# Patient Record
Sex: FEMALE | Race: WHITE | NOT HISPANIC OR LATINO | Employment: UNEMPLOYED | URBAN - METROPOLITAN AREA
[De-identification: names, ages, dates, MRNs, and addresses within clinical notes are randomized per-mention and may not be internally consistent; named-entity substitution may affect disease eponyms.]

---
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
## 2023-01-12 ENCOUNTER — EVALUATION (OUTPATIENT)
Dept: PHYSICAL THERAPY | Facility: MEDICAL CENTER | Age: 18
End: 2023-01-12

## 2023-01-12 DIAGNOSIS — G51.8 CRANIOFACIAL PAIN SYNDROME: Primary | ICD-10-CM

## 2023-01-12 DIAGNOSIS — R51.9 CRANIOFACIAL PAIN: ICD-10-CM

## 2023-01-12 DIAGNOSIS — G50.1 ATYPICAL FACIAL PAIN: ICD-10-CM

## 2023-01-12 NOTE — LETTER
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 and resulting in significant poor TMJ and lingual movement coordination  These issues are exacerbated by cervical hypomobility resulting in worry over not knowing what's wrong, concern at no signs of improvement and fear of not being able to hold a job due to frequent medical absences  In addition to the below plan of care, she would benefit from further consultation for a maxillary night splint and also to investigate tooth #2 and #15 due to significant ipsilateral pain with compression  I expect she will improve well with a multidisciplinary approach including neuromotor re-education, mobilizations and mobility exercises, behavioral alteration and a night splint over a period of 3 months  Please verify that you agree with the plan of care by signing the attached order  If you have any questions or concerns, please do not hesitate to call  I sincerely appreciate the opportunity to share in the care of one of your patients and hope to have another opportunity to work with you in the near future  Sincerely,    Toni Miranda DPT, PhD    Referring Provider:      I certify that I have read the below Plan of Care and certify the need for these services furnished under this plan of treatment while under my care                      [address] [place] [id number]  Via Fax: [phone]          PT Evaluation     Today's date: 2023  Patient name: Zeyad Thomas  : 2005  MRN: [id number]  Referring provider: Pool Gross DO  Dx:   Encounter Diagnosis     ICD-10-CM    1  Craniofacial pain syndrome  G51 8       2  Craniofacial pain  R51 9       3  Atypical facial pain  G50 1           Start Time: 1610  Stop Time: 1700  Total time in clinic (min): 50 minutes    Assessment  Assessment details: Problem List:  1) B ADDwR - addressing with neuromotor retraining   2) poor TMJ movement coordination - addressing with neuromotor retraining   3) poor lingual movement coordination - addressing with neuromotor retraining   4) cervical hypomobility - addressing with mobs and mobility exercises     Zeyad Thomas is a pleasant 21 y o  female who presents with B craniofacial pain that began about 6 months ago and has progressively worsened  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 and resulting in significant poor TMJ and lingual movement coordination  These issues are exacerbated by cervical hypomobility resulting in worry over not knowing what's wrong, concern at no signs of improvement and fear of not being able to hold a job due to frequent medical absences  In addition to the below plan of care, she would benefit from further consultation for a maxillary night splint and also to investigate tooth #2 and #15 due to significant ipsilateral pain with compression  I expect she will improve well with a multidisciplinary approach including neuromotor re-education, mobilizations and mobility exercises, behavioral alteration and a night splint over a period of 3 months  Positive prognostic indicators include positive attitude toward recovery  Negative prognostic indicators include chronicity of symptoms and high symptom irritability        Comparable signs:  1) chewing  2) cervical flexion rotation test (10 deg to L)  3) L cervical rotation    Goals  Patient will be independent with home exercise program    Patient will be able to manage symptoms independently  Patient will be able to chew without limitation due to pain  Patient will be able to yawn without limitation due to pain  Plan  Duration in visits: 6        Subjective Evaluation    History of Present Illness  Mechanism of injury: Patient reports she began having craniofacial pain insidiously 6 months ago  She reports the pain has worsened significantly over the past 6 months  Pain is worse first thing in AM and also with chewing  Yawning is a little more intermittent  She also reports daily headaches that began over 5 years ago  She does note her headaches are worsened with clenching and she tends to clench throughout the day and brux at night  Quality of life: good    Pain  At worst pain ratin    Patient Goals  Patient goals for therapy: decreased pain, increased motion and independence with ADLs/IADLs          Objective     Static Posture     Head  Forward  Shoulders  Rounded  Postural Observations  Seated posture: fair  Standing posture: fair        Palpation   Left   Hypertonic in the scalenes, sternocleidomastoid, suboccipitals, upper trapezius, masseter, temporalis, lateral pterygoid and medial pterygoid  Tenderness of the scalenes, sternocleidomastoid, suboccipitals, upper trapezius, masseter, temporalis, lateral pterygoid and medial pterygoid  Right   Hypertonic in the scalenes, sternocleidomastoid, suboccipitals, upper trapezius, masseter, temporalis, lateral pterygoid and medial pterygoid  Tenderness of the scalenes, sternocleidomastoid, suboccipitals, upper trapezius, masseter, temporalis, lateral pterygoid and medial pterygoid       Neurological Testing     Sensation   Cervical/Thoracic   Left   Intact: light touch    Right   Intact: light touch    Reflexes   Left   Biceps (C5/C6): normal (2+)  Alvarado's reflex: negative    Right   Biceps (C5/C6): normal (2+)  Alvarado's reflex: negative    Active Range of Motion   Cervical/Thoracic Spine       Cervical  Subcranial protraction:   Restriction level: moderate  Flexion:  WFL  Extension:  WFL  Left lateral flexion:  WFL  Right lateral flexion:  WFL  Left rotation:  Restriction level: moderate  Right rotation:  WFL  Left Shoulder   Normal active range of motion    Right Shoulder   Normal active range of motion    Left Elbow   Normal active range of motion    Right Elbow   Normal active range of motion    Left Wrist   Normal active range of motion    Right Wrist   Normal active range of motion    Joint Play     Hypomobile: C1, C2, C3, C4, C5, C6, C7 and T1     Strength/Myotome Testing   Cervical Spine     Left   Normal strength    Right   Normal strength    Tests   Cervical   Positive cervical distraction test and craniocervical flexion test   Negative alar ligament test, transverse ligament test and VBI  Left   Positive cervical flexion-rotation test     Negative Spurling's Test A       Right   Negative Spurling's Test A and cervical flexion-rotation test      Ambulation   Weight-Bearing Status   Weight-Bearing Status (Left): full weight bearing   Weight-Bearing Status (Right): full weight-bearing      Observational Gait   Gait: within functional limits   TMJ   Jaw observations: facial symmetry within normal limits  Occlusion class: class I (normal)  Patient does not have a  cleft palate  Dental findings: Low lingual resting position  Poor lingual dissociation  Tenderness and radiating ipsilateral pain with palpation of tooth #2 and #15  Scalloping of tongue: yes  Cusp wear: yes  Jaw trauma: no  Prognathia: no  Retrognathia: no  Mursicatio buccarum: yes  Corrective appliance comments: She reports she is getting a retainer shortly  Joint sounds left: clicking  Joint sounds right: clicking  Lateral bite test, Left: pain on right  Lateral bite test, right: pain on left  ROM: pain with movement  Opening (mm): 43   Lateral excursion, left (mm): 10 and pain  Lateral excursion, right (mm)t: 5 and pain   ROM comments: Inconsistent S curve deviation during opening and closing            Precautions: none    Date: 1/12      Manual       SOR/MFR SK      C1-2 SNAG SK                           Neuromuscular Re-education       TMJ relaxed position SK      Cervical retraction SK      TMJ controlled opening SK                    Therapeutic Exercise                                   Therapeutic Activities                     Gait Training                     Modalities                     Access Code: Lucy Young  URL: [email or web address]

## 2023-01-12 NOTE — PROGRESS NOTES
PT Evaluation     Today's date: 2023  Patient name: Trung Rosas  : 2005  MRN: [id number]  Referring provider: Dodie Lowry DO  Dx:   Encounter Diagnosis     ICD-10-CM    1  Craniofacial pain syndrome  G51 8       2  Craniofacial pain  R51 9       3  Atypical facial pain  G50 1           Start Time: 1610  Stop Time: 1700  Total time in clinic (min): 50 minutes    Assessment  Assessment details: Problem List:  1) B ADDwR - addressing with neuromotor retraining   2) poor TMJ movement coordination - addressing with neuromotor retraining   3) poor lingual movement coordination - addressing with neuromotor retraining   4) cervical hypomobility - addressing with mobs and mobility exercises     Trung Rosas is a pleasant 21 y o  female who presents with B craniofacial pain that began about 6 months ago and has progressively worsened  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 and resulting in significant poor TMJ and lingual movement coordination  These issues are exacerbated by cervical hypomobility resulting in worry over not knowing what's wrong, concern at no signs of improvement and fear of not being able to hold a job due to frequent medical absences  In addition to the below plan of care, she would benefit from further consultation for a maxillary night splint and also to investigate tooth #2 and #15 due to significant ipsilateral pain with compression  I expect she will improve well with a multidisciplinary approach including neuromotor re-education, mobilizations and mobility exercises, behavioral alteration and a night splint over a period of 3 months  Positive prognostic indicators include positive attitude toward recovery  Negative prognostic indicators include chronicity of symptoms and high symptom irritability        Comparable signs:  1) chewing  2) cervical flexion rotation test (10 deg to L)  3) L cervical rotation    Goals  Patient will be independent with home exercise program    Patient will be able to manage symptoms independently  Patient will be able to chew without limitation due to pain  Patient will be able to yawn without limitation due to pain  Plan  Duration in visits: 6        Subjective Evaluation    History of Present Illness  Mechanism of injury: Patient reports she began having craniofacial pain insidiously 6 months ago  She reports the pain has worsened significantly over the past 6 months  Pain is worse first thing in AM and also with chewing  Yawning is a little more intermittent  She also reports daily headaches that began over 5 years ago  She does note her headaches are worsened with clenching and she tends to clench throughout the day and brux at night  Quality of life: good    Pain  At worst pain ratin    Patient Goals  Patient goals for therapy: decreased pain, increased motion and independence with ADLs/IADLs          Objective     Static Posture     Head  Forward  Shoulders  Rounded  Postural Observations  Seated posture: fair  Standing posture: fair        Palpation   Left   Hypertonic in the scalenes, sternocleidomastoid, suboccipitals, upper trapezius, masseter, temporalis, lateral pterygoid and medial pterygoid  Tenderness of the scalenes, sternocleidomastoid, suboccipitals, upper trapezius, masseter, temporalis, lateral pterygoid and medial pterygoid  Right   Hypertonic in the scalenes, sternocleidomastoid, suboccipitals, upper trapezius, masseter, temporalis, lateral pterygoid and medial pterygoid  Tenderness of the scalenes, sternocleidomastoid, suboccipitals, upper trapezius, masseter, temporalis, lateral pterygoid and medial pterygoid       Neurological Testing     Sensation   Cervical/Thoracic   Left   Intact: light touch    Right   Intact: light touch    Reflexes   Left   Biceps (C5/C6): normal (2+)  Alvarado's reflex: negative    Right   Biceps (C5/C6): normal (2+)  Alvarado's reflex: negative    Active Range of Motion   Cervical/Thoracic Spine       Cervical  Subcranial protraction:   Restriction level: moderate  Flexion:  WFL  Extension:  WFL  Left lateral flexion:  WFL  Right lateral flexion:  WFL  Left rotation:  Restriction level: moderate  Right rotation:  WFL  Left Shoulder   Normal active range of motion    Right Shoulder   Normal active range of motion    Left Elbow   Normal active range of motion    Right Elbow   Normal active range of motion    Left Wrist   Normal active range of motion    Right Wrist   Normal active range of motion    Joint Play     Hypomobile: C1, C2, C3, C4, C5, C6, C7 and T1     Strength/Myotome Testing   Cervical Spine     Left   Normal strength    Right   Normal strength    Tests   Cervical   Positive cervical distraction test and craniocervical flexion test   Negative alar ligament test, transverse ligament test and VBI  Left   Positive cervical flexion-rotation test     Negative Spurling's Test A       Right   Negative Spurling's Test A and cervical flexion-rotation test      Ambulation   Weight-Bearing Status   Weight-Bearing Status (Left): full weight bearing   Weight-Bearing Status (Right): full weight-bearing      Observational Gait   Gait: within functional limits   TMJ   Jaw observations: facial symmetry within normal limits  Occlusion class: class I (normal)  Patient does not have a  cleft palate  Dental findings: Low lingual resting position  Poor lingual dissociation  Tenderness and radiating ipsilateral pain with palpation of tooth #2 and #15  Scalloping of tongue: yes  Cusp wear: yes  Jaw trauma: no  Prognathia: no  Retrognathia: no  Mursicatio buccarum: yes  Corrective appliance comments: She reports she is getting a retainer shortly  Joint sounds left: clicking  Joint sounds right: clicking  Lateral bite test, Left: pain on right  Lateral bite test, right: pain on left  ROM: pain with movement  Opening (mm): 43   Lateral excursion, left (mm): 10 and pain  Lateral excursion, right (mm)t: 5 and pain   ROM comments: Inconsistent S curve deviation during opening and closing             Precautions: none    Date: 1/12      Manual       SOR/MFR SK      C1-2 SNAG SK                           Neuromuscular Re-education       TMJ relaxed position SK      Cervical retraction SK      TMJ controlled opening SK                    Therapeutic Exercise                                   Therapeutic Activities                     Gait Training                     Modalities                     Access Code: Hortencia Barber  URL: [email or web address]

## 2023-01-12 NOTE — LETTER
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 and resulting in significant poor TMJ and lingual movement coordination  These issues are exacerbated by cervical hypomobility resulting in worry over not knowing what's wrong, concern at no signs of improvement and fear of not being able to hold a job due to frequent medical absences  In addition to the below plan of care, she would benefit from further consultation for a maxillary night splint and also to investigate tooth #2 and #15 due to significant ipsilateral pain with compression  I expect she will improve well with a multidisciplinary approach including neuromotor re-education, mobilizations and mobility exercises, behavioral alteration and a night splint over a period of 3 months  Please verify that you agree with the plan of care by signing the attached order  If you have any questions or concerns, please do not hesitate to call  I sincerely appreciate the opportunity to share in the care of one of your patients and hope to have another opportunity to work with you in the near future  Sincerely,    Rafita Griffiths DPT, PhD      Referring Provider:      I certify that I have read the below Plan of Care and certify the need for these services furnished under this plan of treatment while under my care                      Lucy Snellen, DMD  [address] [facility] [address]  Via Fax: [phone]          PT Evaluation     Today's date: 2023  Patient name: Krishan Hargrove  : 2005  MRN: [id number]  Referring provider: Chantelle Rosas DMD  Dx:   Encounter Diagnosis     ICD-10-CM    1  Craniofacial pain syndrome  G51 8       2  Craniofacial pain  R51 9       3  Atypical facial pain  G50 1                      Assessment  Assessment details: Problem List:  1) B ADDwR - addressing with neuromotor retraining   2) poor TMJ movement coordination - addressing with neuromotor retraining   3) poor lingual movement coordination - addressing with neuromotor retraining   4) cervical hypomobility - addressing with mobs and mobility exercises     Krishan Hargrove is a pleasant 21 y o  female who presents with B craniofacial pain that began about 6 months ago and has progressively worsened  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 and resulting in significant poor TMJ and lingual movement coordination  These issues are exacerbated by cervical hypomobility resulting in worry over not knowing what's wrong, concern at no signs of improvement and fear of not being able to hold a job due to frequent medical absences  In addition to the below plan of care, she would benefit from further consultation for a maxillary night splint and also to investigate tooth #2 and #15 due to significant ipsilateral pain with compression  I expect she will improve well with a multidisciplinary approach including neuromotor re-education, mobilizations and mobility exercises, behavioral alteration and a night splint over a period of 3 months  Positive prognostic indicators include positive attitude toward recovery  Negative prognostic indicators include chronicity of symptoms and high symptom irritability        Comparable signs:  1) chewing  2) cervical flexion rotation test (10 deg to L)  3) L cervical rotation    Goals  Patient will be independent with home exercise program    Patient will be able to manage symptoms independently  Patient will be able to chew without limitation due to pain  Patient will be able to yawn without limitation due to pain  Plan  Duration in visits: 6        Subjective Evaluation    History of Present Illness  Mechanism of injury: Patient reports she began having craniofacial pain insidiously 6 months ago  She reports the pain has worsened significantly over the past 6 months  Pain is worse first thing in AM and also with chewing  Yawning is a little more intermittent  She also reports daily headaches that began over 5 years ago  She does note her headaches are worsened with clenching and she tends to clench throughout the day and brux at night  Quality of life: good    Pain  At worst pain ratin    Patient Goals  Patient goals for therapy: decreased pain, increased motion and independence with ADLs/IADLs          Objective     Static Posture     Head  Forward  Shoulders  Rounded  Postural Observations  Seated posture: fair  Standing posture: fair        Palpation   Left   Hypertonic in the scalenes, sternocleidomastoid, suboccipitals, upper trapezius, masseter, temporalis, lateral pterygoid and medial pterygoid  Tenderness of the scalenes, sternocleidomastoid, suboccipitals, upper trapezius, masseter, temporalis, lateral pterygoid and medial pterygoid  Right   Hypertonic in the scalenes, sternocleidomastoid, suboccipitals, upper trapezius, masseter, temporalis, lateral pterygoid and medial pterygoid  Tenderness of the scalenes, sternocleidomastoid, suboccipitals, upper trapezius, masseter, temporalis, lateral pterygoid and medial pterygoid       Neurological Testing     Sensation   Cervical/Thoracic   Left   Intact: light touch    Right   Intact: light touch    Reflexes   Left   Biceps (C5/C6): normal (2+)  Alvarado's reflex: negative    Right   Biceps (C5/C6): normal (2+)  Alvarado's reflex: negative    Active Range of Motion   Cervical/Thoracic Spine Cervical  Subcranial protraction:   Restriction level: moderate  Flexion:  WFL  Extension:  WFL  Left lateral flexion:  WFL  Right lateral flexion:  WFL  Left rotation:  Restriction level: moderate  Right rotation:  WFL  Left Shoulder   Normal active range of motion    Right Shoulder   Normal active range of motion    Left Elbow   Normal active range of motion    Right Elbow   Normal active range of motion    Left Wrist   Normal active range of motion    Right Wrist   Normal active range of motion    Joint Play     Hypomobile: C1, C2, C3, C4, C5, C6, C7 and T1     Strength/Myotome Testing   Cervical Spine     Left   Normal strength    Right   Normal strength    Tests   Cervical   Positive cervical distraction test and craniocervical flexion test   Negative alar ligament test, transverse ligament test and VBI  Left   Positive cervical flexion-rotation test     Negative Spurling's Test A       Right   Negative Spurling's Test A and cervical flexion-rotation test      Ambulation   Weight-Bearing Status   Weight-Bearing Status (Left): full weight bearing   Weight-Bearing Status (Right): full weight-bearing      Observational Gait   Gait: within functional limits   TMJ   Jaw observations: facial symmetry within normal limits  Occlusion class: class I (normal)  Patient does not have a  cleft palate  Dental findings: Low lingual resting position  Poor lingual dissociation  Tenderness and radiating ipsilateral pain with palpation of tooth #2 and #15  Scalloping of tongue: yes  Cusp wear: yes  Jaw trauma: no  Prognathia: no  Retrognathia: no  Mursicatio buccarum: yes  Corrective appliance comments: She reports she is getting a retainer shortly  Joint sounds left: clicking  Joint sounds right: clicking  Lateral bite test, Left: pain on right  Lateral bite test, right: pain on left  ROM: pain with movement  Opening (mm): 43   Lateral excursion, left (mm): 10 and pain  Lateral excursion, right (mm)t: 5 and pain   ROM comments: Inconsistent S curve deviation during opening and closing            Precautions: none    Date: 1/12      Manual       SOR/MFR SK      C1-2 SNAG SK                           Neuromuscular Re-education       TMJ relaxed position SK      Cervical retraction SK      TMJ controlled opening SK                    Therapeutic Exercise                                   Therapeutic Activities                     Gait Training                     Modalities                     Access Code: Aysha Bower  URL: [email or web address]

## 2023-01-19 ENCOUNTER — OFFICE VISIT (OUTPATIENT)
Dept: PHYSICAL THERAPY | Facility: MEDICAL CENTER | Age: 18
End: 2023-01-19

## 2023-01-19 DIAGNOSIS — R51.9 CRANIOFACIAL PAIN: ICD-10-CM

## 2023-01-19 DIAGNOSIS — G50.1 ATYPICAL FACIAL PAIN: ICD-10-CM

## 2023-01-19 DIAGNOSIS — G51.8 CRANIOFACIAL PAIN SYNDROME: Primary | ICD-10-CM

## 2023-01-19 NOTE — PROGRESS NOTES
Daily Note     Today's date: 2023  Patient name: Parish Camargo  : 2005  MRN: [id number]  Referring provider: Bebeto Barnett DMD  Dx:   Encounter Diagnosis     ICD-10-CM    1  Craniofacial pain syndrome  G51 8       2  Craniofacial pain  R51 9       3  Atypical facial pain  G50 1                      Assessment:   Problem List:  1) B ADDwR - addressing with neuromotor retraining   2) poor TMJ movement coordination - addressing with neuromotor retraining   3) poor lingual movement coordination - addressing with neuromotor retraining   4) cervical hypomobility - addressing with mobs and mobility exercises     Comparable signs:  1) chewing - slight improvement  2) cervical flexion rotation test (10 deg to L) - improved  3) L cervical rotation - improved    Goals  Patient will be independent with home exercise program  - in progress  Patient will be able to manage symptoms independently  - in progress  Patient will be able to chew without limitation due to pain  - in progress  Patient will be able to yawn without limitation due to pain  - in progress      Plan: Reassess in 3 weeks  Subjective: Patient reports she has been doing her exercises regularly          Objective: See treatment diary below  ROM: pain with movement  Opening (mm): 43   Lateral excursion, left (mm): 10 and pain  Lateral excursion, right (mm): 10 and pain   ROM comments: Inconsistent S curve deviation during opening and closing    Precautions: none    Date:       Manual       SOR/MFR       C1-2 SNAG                            Neuromuscular Re-education       TMJ relaxed position SK      Cervical retraction SK      TMJ controlled opening SK      TMJ lateral deviation SK      Lingual dissociation SK                           Therapeutic Exercise                                   Therapeutic Activities                     Gait Training                     Modalities                     Access Code: [email or web address] Zurita  URL: [email or web address]

## 2023-02-09 ENCOUNTER — APPOINTMENT (OUTPATIENT)
Dept: PHYSICAL THERAPY | Facility: MEDICAL CENTER | Age: 18
End: 2023-02-09

## 2023-03-16 ENCOUNTER — OFFICE VISIT (OUTPATIENT)
Dept: PHYSICAL THERAPY | Facility: MEDICAL CENTER | Age: 18
End: 2023-03-16

## 2023-03-16 DIAGNOSIS — R51.9 CRANIOFACIAL PAIN: ICD-10-CM

## 2023-03-16 DIAGNOSIS — G50.1 ATYPICAL FACIAL PAIN: ICD-10-CM

## 2023-03-16 DIAGNOSIS — G51.8 CRANIOFACIAL PAIN SYNDROME: Primary | ICD-10-CM

## 2023-03-16 NOTE — PROGRESS NOTES
Daily Note     Today's date: 3/16/2023  Patient name: Wendie Schaefer  : 2005  MRN: [id number]  Referring provider: Yvonne Corrales DMD  Dx:   Encounter Diagnosis     ICD-10-CM    1  Craniofacial pain syndrome  G51 8       2  Craniofacial pain  R51 9       3  Atypical facial pain  G50 1                      Assessment:   Problem List:  1) B ADDwR - addressing with neuromotor retraining   2) poor TMJ movement coordination - addressing with neuromotor retraining   3) poor lingual movement coordination - addressing with neuromotor retraining   4) cervical hypomobility - addressing with mobs and mobility exercises     Comparable signs:  1) chewing - slight improvement  2) cervical flexion rotation test (10 deg to L) - improved  3) L cervical rotation - improved    Goals  Patient will be independent with home exercise program  - in progress  Patient will be able to manage symptoms independently  - in progress  Patient will be able to chew without limitation due to pain  - in progress  Patient will be able to yawn without limitation due to pain  - in progress      Plan: Reassess in 5 weeks  Subjective: Patient reports she has been doing her exercises regularly  She still has pain with chewing and yawning but less so  She notes her dog ate her night splint so she is now waiting for a new one        Objective: See treatment diary below  ROM: pain with movement  Opening (mm): 43   Lateral excursion, left (mm): 10   Lateral excursion, right (mm): 10    ROM comments: Inconsistent S curve deviation during opening and closing, but none with concentration    Precautions: none    Date: 3/16      Manual       SOR/MFR       C1-2 SNAG                            Neuromuscular Re-education       TMJ relaxed position SK      Cervical retraction SK      TMJ controlled opening SK      TMJ lateral deviation SK      Lingual dissociation SK      TMJ rhythmic stab SK                    Therapeutic Exercise Therapeutic Activities                     Gait Training                     Modalities                     Access Code: Angela Elaine: [email or web address]

## 2024-03-18 ENCOUNTER — OFFICE VISIT (OUTPATIENT)
Dept: OBGYN CLINIC | Facility: CLINIC | Age: 19
End: 2024-03-18
Payer: COMMERCIAL

## 2024-03-18 VITALS
WEIGHT: 174.8 LBS | HEIGHT: 66 IN | BODY MASS INDEX: 28.09 KG/M2 | DIASTOLIC BLOOD PRESSURE: 72 MMHG | SYSTOLIC BLOOD PRESSURE: 114 MMHG

## 2024-03-18 DIAGNOSIS — R10.2 PELVIC PAIN IN FEMALE: ICD-10-CM

## 2024-03-18 DIAGNOSIS — Z11.3 SCREENING EXAMINATION FOR STD (SEXUALLY TRANSMITTED DISEASE): ICD-10-CM

## 2024-03-18 DIAGNOSIS — N92.6 MISSED MENSES: ICD-10-CM

## 2024-03-18 DIAGNOSIS — N91.4 SECONDARY OLIGOMENORRHEA: Primary | ICD-10-CM

## 2024-03-18 LAB — SL AMB POCT URINE HCG: NORMAL

## 2024-03-18 PROCEDURE — 81025 URINE PREGNANCY TEST: CPT | Performed by: NURSE PRACTITIONER

## 2024-03-18 PROCEDURE — 87591 N.GONORRHOEAE DNA AMP PROB: CPT | Performed by: NURSE PRACTITIONER

## 2024-03-18 PROCEDURE — 87491 CHLMYD TRACH DNA AMP PROBE: CPT | Performed by: NURSE PRACTITIONER

## 2024-03-18 PROCEDURE — 99204 OFFICE O/P NEW MOD 45 MIN: CPT | Performed by: NURSE PRACTITIONER

## 2024-03-18 RX ORDER — IBUPROFEN 200 MG
600 TABLET ORAL EVERY 6 HOURS PRN
COMMUNITY

## 2024-03-18 NOTE — PROGRESS NOTES
Assessment/Plan:    1. Secondary oligomenorrhea  In 17 yo G0, post cessation of OCP in June 2023.  Urine HCG today was negative.  Discussed the possibility of PCOS and the nature of PCOS, including associated issues of infertility, future risk for diabetes and/ or heart disease, risk of endometrial pathology/ cancer.  Will check a fasting PCOS lab panel and a pelvic ultrasound for clinical correlation.  She is interested in conceiving.  RV in 3-4 weeks to discuss work up and management.    - Comprehensive metabolic panel; Future  - DHEA-sulfate; Future  - Estradiol; Future  - Follicle stimulating hormone; Future  - Insulin, fasting; Future  - Lipid panel; Future  - Luteinizing hormone; Future  - Prolactin; Future  - Testosterone, free, total; Future  - TSH, 3rd generation with Free T4 reflex; Future  - 17-Hydroxyprogesterone; Future  - US pelvis complete w transvaginal; Future    2. Missed menses  Negative hcg    - POCT urine HCG    3. Pelvic pain in female  As above, will be checking pelvic US    4. Screening examination for STD (sexually transmitted disease)  New partner, unprotected    - Chlamydia/GC amplified DNA by PCR      I have spent a total time of 45 minutes on 03/18/24 in caring for this patient including Diagnostic results, Instructions for management, Patient and family education, Risk factor reductions, Impressions, Counseling / Coordination of care, Documenting in the medical record, Reviewing / ordering tests, medicine, procedures  , and Obtaining or reviewing history  .    [id number]    Subjective:      Patient ID: Julia Haines is a 18 y.o. female.    HPI  NEW PATIENT PROBLEM  CC: irregular menstrual periods    17 yo G0 presents with complaint of irregular menstrual patterns.  Started OCP at age 12 for cycle management due to irregular periods/ elongated bleeding.  She discontinued her OCP in 6/2023.  From June through Nov 2023 she reports having monthly periods.  Her last menses was in November  "and she did not bleed again until 2/14/24 at which time is was very scant staining for one day.  [facility] for evaluation.  A urine HCG at that visit was negative and a PCOS panel and pelvic US were ordered.  She has not had any of the studies done yet.  She presents for a 2nd opinion today.    Seen at ED several times for RLQ pain.  Imaging is always normal.  Told likely due to constipation or muscle spasm.  Takes miralax and stool softeners which is helpful.  2/21/24 CT scan: no acute findings  4/19/24 pelvic US: negative  STD hx: none    The following portions of the patient's history were reviewed and updated as appropriate: allergies, current medications, past family history, past medical history, past social history, past surgical history, and problem list.    Review of Systems   Constitutional:  Negative for chills, fatigue, fever and unexpected weight change.   Eyes:  Negative for visual disturbance.   Cardiovascular:  Negative for palpitations.   Gastrointestinal:  Positive for constipation and nausea (upon awakening, then resolves). Negative for diarrhea and vomiting.   Endocrine: Positive for heat intolerance (night sweats). Negative for cold intolerance, polydipsia, polyphagia and polyuria.   Genitourinary:  Positive for menstrual problem and pelvic pain (RLQ, was seen in ED, imaging always normal). Negative for difficulty urinating, dyspareunia, dysuria, frequency, genital sores, urgency, vaginal bleeding, vaginal discharge and vaginal pain.   Skin:         Denies excessive oiliness, dryness, acne or hair growth   Psychiatric/Behavioral:  Negative for agitation, dysphoric mood, sleep disturbance and suicidal ideas. The patient is not nervous/anxious.     Denies nipple discharge.     Objective:      /72 (BP Location: Left arm, Patient Position: Sitting, Cuff Size: Standard)   Ht 5' 6\" (1.676 m)   Wt 79.3 kg (174 lb 12.8 oz)   LMP 11/01/2023 Comment: light spotting on feb " 14 for only a day  BMI 28.21 kg/m²     Urine hcg negative     Physical Exam  Constitutional:       General: She is not in acute distress.     Appearance: Normal appearance. She is well-developed. She is not ill-appearing or diaphoretic.      Comments: BMI 28.2   HENT:      Head: Normocephalic and atraumatic.   Eyes:      Pupils: Pupils are equal, round, and reactive to light.   Neck:      Thyroid: No thyromegaly.   Pulmonary:      Effort: Pulmonary effort is normal.   Abdominal:      General: Abdomen is flat.      Palpations: Abdomen is soft. There is no mass.      Tenderness: There is no abdominal tenderness.   Genitourinary:     General: Normal vulva.      Exam position: Lithotomy position.      Labia:         Right: No rash, tenderness, lesion or injury.         Left: No rash, tenderness, lesion or injury.       Vagina: No signs of injury and foreign body. No vaginal discharge, erythema, tenderness or bleeding.      Cervix: No cervical motion tenderness, discharge or friability.      Uterus: Not enlarged and not tender.       Adnexa:         Right: No mass or tenderness.          Left: No mass or tenderness.     Musculoskeletal:      Cervical back: Neck supple.   Lymphadenopathy:      Cervical:      Right cervical: No superficial cervical adenopathy.     Left cervical: No superficial cervical adenopathy.   Skin:     General: Skin is warm and dry.   Neurological:      General: No focal deficit present.      Mental Status: She is alert and oriented to person, place, and time.   Psychiatric:         Mood and Affect: Mood normal.         Behavior: Behavior normal.         Thought Content: Thought content normal.         Judgment: Judgment normal.

## 2024-03-19 LAB
C TRACH DNA SPEC QL NAA+PROBE: NEGATIVE
N GONORRHOEA DNA SPEC QL NAA+PROBE: NEGATIVE

## 2024-03-22 ENCOUNTER — APPOINTMENT (OUTPATIENT)
Dept: LAB | Facility: CLINIC | Age: 19
End: 2024-03-22
Payer: COMMERCIAL

## 2024-03-22 DIAGNOSIS — N91.4 SECONDARY OLIGOMENORRHEA: ICD-10-CM

## 2024-03-22 LAB
ALBUMIN SERPL BCP-MCNC: 4.7 G/DL (ref 3.5–5)
ALP SERPL-CCNC: 83 U/L (ref 34–104)
ALT SERPL W P-5'-P-CCNC: 11 U/L (ref 7–52)
ANION GAP SERPL CALCULATED.3IONS-SCNC: 7 MMOL/L (ref 4–13)
AST SERPL W P-5'-P-CCNC: 14 U/L (ref 13–39)
BILIRUB SERPL-MCNC: 0.61 MG/DL (ref 0.2–1)
BUN SERPL-MCNC: 12 MG/DL (ref 5–25)
CALCIUM SERPL-MCNC: 9.4 MG/DL (ref 8.4–10.2)
CHLORIDE SERPL-SCNC: 105 MMOL/L (ref 96–108)
CHOLEST SERPL-MCNC: 170 MG/DL
CO2 SERPL-SCNC: 26 MMOL/L (ref 21–32)
CREAT SERPL-MCNC: 0.66 MG/DL (ref 0.6–1.3)
ESTRADIOL SERPL-MCNC: 77.6 PG/ML
FSH SERPL-ACNC: 2.1 MIU/ML
GFR SERPL CREATININE-BSD FRML MDRD: 127 ML/MIN/1.73SQ M
GLUCOSE P FAST SERPL-MCNC: 79 MG/DL (ref 65–99)
HDLC SERPL-MCNC: 50 MG/DL
INSULIN SERPL-ACNC: 13.24 UIU/ML (ref 1.9–23)
LDLC SERPL CALC-MCNC: 105 MG/DL (ref 0–100)
LH SERPL-ACNC: 1.9 MIU/ML
NONHDLC SERPL-MCNC: 120 MG/DL
POTASSIUM SERPL-SCNC: 4.3 MMOL/L (ref 3.5–5.3)
PROLACTIN SERPL-MCNC: 6.21 NG/ML (ref 3.34–26.72)
PROT SERPL-MCNC: 7.4 G/DL (ref 6.4–8.4)
SODIUM SERPL-SCNC: 138 MMOL/L (ref 135–147)
TRIGL SERPL-MCNC: 77 MG/DL
TSH SERPL DL<=0.05 MIU/L-ACNC: 1.41 UIU/ML (ref 0.45–4.5)

## 2024-03-22 PROCEDURE — 80053 COMPREHEN METABOLIC PANEL: CPT

## 2024-03-22 PROCEDURE — 36415 COLL VENOUS BLD VENIPUNCTURE: CPT

## 2024-03-22 PROCEDURE — 84403 ASSAY OF TOTAL TESTOSTERONE: CPT

## 2024-03-22 PROCEDURE — 83002 ASSAY OF GONADOTROPIN (LH): CPT

## 2024-03-22 PROCEDURE — 80061 LIPID PANEL: CPT

## 2024-03-22 PROCEDURE — 83525 ASSAY OF INSULIN: CPT

## 2024-03-22 PROCEDURE — 84443 ASSAY THYROID STIM HORMONE: CPT

## 2024-03-22 PROCEDURE — 83001 ASSAY OF GONADOTROPIN (FSH): CPT

## 2024-03-22 PROCEDURE — 82627 DEHYDROEPIANDROSTERONE: CPT

## 2024-03-22 PROCEDURE — 82670 ASSAY OF TOTAL ESTRADIOL: CPT

## 2024-03-22 PROCEDURE — 83498 ASY HYDROXYPROGESTERONE 17-D: CPT

## 2024-03-22 PROCEDURE — 84402 ASSAY OF FREE TESTOSTERONE: CPT

## 2024-03-22 PROCEDURE — 84146 ASSAY OF PROLACTIN: CPT

## 2024-03-24 LAB
DHEA-S SERPL-MCNC: 144 UG/DL (ref 110–431.7)
TESTOST FREE SERPL-MCNC: 0.4 PG/ML (ref 0–4.2)
TESTOST SERPL-MCNC: 19 NG/DL (ref 13–71)

## 2024-03-26 LAB — 17OHP SERPL-MCNC: 42 NG/DL

## 2024-03-26 NOTE — RESULT ENCOUNTER NOTE
Cholesterol panel: LDL elevated at 105.  Recommend follow up with PCP.  17 hydroxyprogesterone 42  DHEAS 144  Free testosterone 0.4; total testosterone 19  Estradiol 77.6, FSH 2.1, LH 1.9  Prolactin 6.2, TSH 1.4, fasting insulin 13.2  CMP normal  Pelvic US scheduled.  Patient has follow up appt with me 4/8/24.

## 2024-04-10 ENCOUNTER — HOSPITAL ENCOUNTER (OUTPATIENT)
Dept: ULTRASOUND IMAGING | Facility: MEDICAL CENTER | Age: 19
Discharge: HOME | End: 2024-04-10
Payer: COMMERCIAL

## 2024-04-10 DIAGNOSIS — N91.4 SECONDARY OLIGOMENORRHEA: ICD-10-CM

## 2024-04-10 PROCEDURE — 76856 US EXAM PELVIC COMPLETE: CPT

## 2024-04-10 PROCEDURE — 76830 TRANSVAGINAL US NON-OB: CPT

## 2024-04-15 ENCOUNTER — OFFICE VISIT (OUTPATIENT)
Dept: OBGYN CLINIC | Facility: CLINIC | Age: 19
End: 2024-04-15
Payer: COMMERCIAL

## 2024-04-15 VITALS
SYSTOLIC BLOOD PRESSURE: 112 MMHG | BODY MASS INDEX: 27.97 KG/M2 | DIASTOLIC BLOOD PRESSURE: 64 MMHG | HEIGHT: 66 IN | WEIGHT: 174 LBS

## 2024-04-15 DIAGNOSIS — N91.4 SECONDARY OLIGOMENORRHEA: Primary | ICD-10-CM

## 2024-04-15 PROCEDURE — 99213 OFFICE O/P EST LOW 20 MIN: CPT | Performed by: NURSE PRACTITIONER

## 2024-04-15 RX ORDER — MEDROXYPROGESTERONE ACETATE 10 MG/1
TABLET ORAL
Qty: 10 TABLET | Refills: 3 | Status: SHIPPED | OUTPATIENT
Start: 2024-04-15

## 2024-04-15 NOTE — PROGRESS NOTES
Assessment/Plan:    1. Secondary oligomenorrhea    - medroxyPROGESTERone (PROVERA) 10 mg tablet; Take one by mouth daily for ten days whenever you are without a period for two months.  Dispense: 10 tablet; Refill: 3    Reviewed all lab work in detail.  Explained that the results thus far do not support diagnosis of PCOS.  Suggested she see her PCP regarding her elevated LDLs and she has made an appointment.  We will await the final ultrasound result and I will call her to discuss the findings.  Since she desires to conceive, she is agreeable to inducing a period every 2 months with provera as needed.  Explained how to take the provera and to check a urine HCG prior to taking it.    RV 9/2024 for Yearly/1st pap and to see how she is doing.    I have spent a total time of 25 minutes on 04/15/24 in caring for this patient including Diagnostic results, Risks and benefits of tx options, Instructions for management, Patient and family education, Impressions, and Documenting in the medical record.      Subjective:      Patient ID: Julia Haines is a 18 y.o. female.    HPI  PROBLEM VISIT  CC: secondary oligomenorrhea    Historically irregular cycles which was treated with an OCP.  She d/dionisio OCP 6/2023, initially had monthly cycles then was without a period for 4 months.  She had a period in 11/2023, then skipped until 2/2024 at which time she only spotted.  She eventually had a spontaneous period that started on 3/21 which was very heavy, using a pad and tampon, changing every 30-60 mins, no clots, no cramps.    She is interested in conceiving.    Hormonal panel and pelvic US were ordered to rule out PCOS.  Labs:   Cholesterol panel: LDL elevated at 105.  Recommend follow up with PCP.  17 hydroxyprogesterone 42  DHEAS 144  Free testosterone 0.4; total testosterone 19  Estradiol 77.6, FSH 2.1, LH 1.9  Prolactin 6.2, TSH 1.4, fasting insulin 13.2  CMP normal    Pelvic US, completed 4/10/24, not read yet.  We contacted the  "radiology reading room and unfortunately could not get a reading in time for patient's visit.    The following portions of the patient's history were reviewed and updated as appropriate: allergies, current medications, past family history, past medical history, past social history, past surgical history, and problem list.    Review of Systems   Constitutional:  Negative for chills and fever.   Genitourinary:  Positive for menstrual problem.         Objective:    /64 (BP Location: Right arm, Patient Position: Sitting, Cuff Size: Standard)   Ht 5' 6\" (1.676 m)   Wt 78.9 kg (174 lb)   LMP 03/21/2024 (Exact Date) Comment: light spotting on feb 14 for only a day  BMI 28.08 kg/m²      Physical Exam  Constitutional:       General: She is not in acute distress.     Appearance: Normal appearance. She is not ill-appearing or diaphoretic.      Comments: Bmi 28.1   HENT:      Head: Normocephalic and atraumatic.   Eyes:      Pupils: Pupils are equal, round, and reactive to light.   Pulmonary:      Effort: Pulmonary effort is normal.   Skin:     General: Skin is warm and dry.   Neurological:      General: No focal deficit present.      Mental Status: She is alert and oriented to person, place, and time.   Psychiatric:         Mood and Affect: Mood normal.         Behavior: Behavior normal.         Thought Content: Thought content normal.         Judgment: Judgment normal.           "

## 2024-04-15 NOTE — PATIENT INSTRUCTIONS
Whenever you are without a spontaneous period for two months, check a urine pregnancy test and take the provera medication once daily for 10 days.

## 2024-05-21 ENCOUNTER — ULTRASOUND (OUTPATIENT)
Dept: OBGYN CLINIC | Facility: CLINIC | Age: 19
End: 2024-05-21
Payer: COMMERCIAL

## 2024-05-21 VITALS
DIASTOLIC BLOOD PRESSURE: 84 MMHG | HEART RATE: 89 BPM | SYSTOLIC BLOOD PRESSURE: 110 MMHG | BODY MASS INDEX: 27.97 KG/M2 | HEIGHT: 66 IN | WEIGHT: 174 LBS

## 2024-05-21 DIAGNOSIS — N92.6 MISSED MENSES: Primary | ICD-10-CM

## 2024-05-21 DIAGNOSIS — Z32.01 POSITIVE PREGNANCY TEST: ICD-10-CM

## 2024-05-21 DIAGNOSIS — N92.6 IRREGULAR MENSES: ICD-10-CM

## 2024-05-21 PROCEDURE — 76830 TRANSVAGINAL US NON-OB: CPT | Performed by: OBSTETRICS & GYNECOLOGY

## 2024-05-21 PROCEDURE — 99214 OFFICE O/P EST MOD 30 MIN: CPT | Performed by: OBSTETRICS & GYNECOLOGY

## 2024-05-21 NOTE — PROGRESS NOTES
Patient is a 18 y.o.  with Patient's last menstrual period was 2024 (exact date). who presents requesting evaluation of pregnancy. Pt was initially scheduled for a dating and viability ultrasound.  Pt reports her LMP was 3/21/2024, however she has irregular periods and can go up to 3 months without a period. She had an hcg on 2024 that was 24. Pt denies any bleeding. Pt advised that she is likely too early in her pregnancy to see a fetus, but I will attempt today. If unable to see anything, will plan to repeat her u/s in 3 weeks. Pt is agreeable.     Past Medical History:   Diagnosis Date    ADHD (attention deficit hyperactivity disorder)     not currently medicated    Anxiety     Developmental delay 2009    History of menorrhagia     treated with OCP    History of palpitations     evaluated 2023; metoprolol    History of suicidal ideation 2022    few years ago, recovered    Seasonal allergic rhinitis        Past Surgical History:   Procedure Laterality Date    WISDOM TOOTH EXTRACTION         OB History    Para Term  AB Living   0 0 0 0 0 0   SAB IAB Ectopic Multiple Live Births   0 0 0 0 0   Obstetric Comments   Menarche: 10   Cycles historically irregular, bled for months at a time.  Started OCP at age 12.  Discontinued 2023, now skipping cycles.   Gardasil completed           Current Outpatient Medications:     ibuprofen (MOTRIN) 200 mg tablet, Take 600 mg by mouth every 6 (six) hours as needed for moderate pain (Patient not taking: Reported on 3/18/2024), Disp: , Rfl:     medroxyPROGESTERone (PROVERA) 10 mg tablet, Take one by mouth daily for ten days whenever you are without a period for two months. (Patient not taking: Reported on 2024), Disp: 10 tablet, Rfl: 3    Allergies   Allergen Reactions    Shellfish Allergy - Food Allergy Anaphylaxis    Shellfish-Derived Products - Food Allergy Other (See Comments)     unknown    Octacosanol Allergic Rhinitis     Coughing,  sneezing       Social History     Socioeconomic History    Marital status: Single     Spouse name: None    Number of children: None    Years of education: None    Highest education level: None   Occupational History    None   Tobacco Use    Smoking status: Never    Smokeless tobacco: Never   Vaping Use    Vaping status: Never Used   Substance and Sexual Activity    Alcohol use: Never    Drug use: Never    Sexual activity: Yes     Partners: Male     Birth control/protection: None     Comment: coitarche 12; 3 lifetime partners; current partner of 6 months   Other Topics Concern    None   Social History Narrative    None     Social Determinants of Health     Financial Resource Strain: Low Risk  (11/16/2022)    [facility]    Overall Financial Resource Strain (CARDIA)     Difficulty of Paying Living Expenses: Not hard at all   Food Insecurity: No Food Insecurity (11/16/2022)    [facility]    Hunger Vital Sign     Worried About Running Out of Food in the Last Year: Never true     Ran Out of Food in the Last Year: Never true   Transportation Needs: No Transportation Needs (11/16/2022)    [facility]    PRAPARE - Transportation     Lack of Transportation (Medical): No     Lack of Transportation (Non-Medical): No   Physical Activity: Not on file   Stress: No Stress Concern Present (11/16/2022)    [facility]    [place] [employer] - Occupational Stress Questionnaire     Feeling of Stress : Not at all   Social Connections: Socially Isolated (11/16/2022)    [facility]    Social Connection and Isolation Panel [NHANES]     Frequency of Communication with Friends and Family: Three times a week     Frequency  of Social Gatherings with Friends and Family: Three times a week     Attends Nondenominational Services: Never     Active Member of Clubs or Organizations: No     Attends Club or Organization Meetings: Never     Marital Status: Never    Intimate Partner Violence: Not At Risk (11/16/2022)    [facility]    Humiliation, Afraid, Rape, and Kick questionnaire     Fear of Current or Ex-Partner: No     Emotionally Abused: No     Physically Abused: No     Sexually Abused: No   Housing Stability: Low Risk  (11/16/2022)    [facility]    Housing Stability Vital Sign     Unable to Pay for Housing in the Last Year: No     Number of Places Lived in the Last Year: 1     Unstable Housing in the Last Year: No       Family History   Problem Relation Age of Onset    ADD / ADHD Mother     Anxiety disorder Mother     Autism Brother     Pancreatic cancer Maternal Grandmother     Bipolar disorder Paternal Grandmother     Heart attack Paternal Grandfather     Breast cancer Neg Hx     Colon cancer Neg Hx     Ovarian cancer Neg Hx     Uterine cancer Neg Hx        Review of Systems   Constitutional:  Negative for chills, fatigue, fever and unexpected weight change.   HENT:  Negative for congestion, mouth sores and sore throat.    Respiratory:  Negative for cough, chest tightness, shortness of breath and wheezing.    Cardiovascular:  Negative for chest pain and palpitations.   Gastrointestinal:  Negative for abdominal distention, abdominal pain, constipation, diarrhea, nausea and vomiting.   Endocrine: Negative for cold intolerance and heat intolerance.   Genitourinary:  Positive for menstrual problem (as per HPI). Negative for dyspareunia, dysuria, genital sores, pelvic pain, vaginal bleeding, vaginal discharge and vaginal pain.   Musculoskeletal:  Negative for arthralgias.   Skin:  Negative for color change and rash.  "  Neurological:  Negative for dizziness, light-headedness and headaches.   Hematological:  Negative for adenopathy.       Blood pressure 110/84, pulse 89, height 5' 6\" (1.676 m), weight 78.9 kg (174 lb), last menstrual period 03/21/2024. and Body mass index is 28.08 kg/m².    Physical Exam  Constitutional:       General: She is not in acute distress.     Appearance: Normal appearance. She is not ill-appearing.   HENT:      Head: Normocephalic and atraumatic.   Eyes:      Extraocular Movements: Extraocular movements intact.      Conjunctiva/sclera: Conjunctivae normal.   Pulmonary:      Effort: Pulmonary effort is normal.   Musculoskeletal:         General: Normal range of motion.      Cervical back: Normal range of motion.   Skin:     General: Skin is warm.      Findings: No erythema or rash.   Neurological:      Mental Status: She is alert and oriented to person, place, and time.   Psychiatric:         Mood and Affect: Mood normal.         Behavior: Behavior normal.         Thought Content: Thought content normal.         Judgment: Judgment normal.         AMB US Pelvic Non OB    Date/Time: 5/21/2024 5:30 PM    Performed by: Helena Olmstead MD  Authorized by: Helena Olmstead MD  Universal Protocol:  Consent: Verbal consent obtained. Written consent not obtained.  Risks and benefits: risks, benefits and alternatives were discussed  Consent given by: patient  Time out: Immediately prior to procedure a \"time out\" was called to verify the correct patient, procedure, equipment, support staff and site/side marked as required.  Patient understanding: patient states understanding of the procedure being performed  Required items: required blood products, implants, devices, and special equipment available  Patient identity confirmed: verbally with patient    Procedure details:     SIS Procedure: No    Technique:  Transvaginal US, Non-OB    Position: lithotomy exam    Uterine findings:     Length (cm): 6.5    Height (cm):  " 4.4    Width (cm):  5.8    Uterine adhesions: not identified      Adnexal mass: not identified      Polyps: not identified      Myomas: not identified      Endometrial stripe visualized: gestational sac without yolk sac or fetal pole noted.      Follicles present: identified      Number of follicles:  2  Left ovary findings:     Left ovary:  Not visualized    Cysts: not identified    Right ovary findings:     Right ovary:  Visualized    Cysts: not identified      Length (cm): 1.9    Height (cm): 1.8    Width (cm): 1.2  Other findings:     Free pelvic fluid: not identified      Free peritoneal fluid: not identified    Post-Procedure Details:     Impression:  Reviewed with patient that while her ultrasound shows a gestational sac, I cannot see a fetal pole or a yolk sac. We reviewed that the etiology of this could be early pregnancy, non viable pregnancy and less likely, ectopic pregnancy. Pt will return for 3 weeks for a dating and viability ultrasound due to hcg of 24 on .    Tolerance:  Tolerated well, no immediate complications    Complications: no complications              A/P:  Pt is a 18 y.o.  with      Julia was seen today for pregnancy ultrasound.    Diagnoses and all orders for this visit:    Missed menses  -     AMB US Pelvic Non OB    Positive pregnancy test  -     AMB US Pelvic Non OB    Irregular menses    Likey the source of inability to visualized pregnancy at intended date. Pt will return in 3 weeks for dating and viability ultrasound.

## 2024-05-28 ENCOUNTER — ROUTINE PRENATAL (OUTPATIENT)
Dept: OBGYN CLINIC | Facility: CLINIC | Age: 19
End: 2024-05-28
Payer: COMMERCIAL

## 2024-05-28 VITALS
DIASTOLIC BLOOD PRESSURE: 60 MMHG | BODY MASS INDEX: 28.12 KG/M2 | SYSTOLIC BLOOD PRESSURE: 103 MMHG | HEIGHT: 66 IN | WEIGHT: 175 LBS

## 2024-05-28 DIAGNOSIS — O41.8X10 SUBCHORIONIC HEMATOMA IN FIRST TRIMESTER, SINGLE OR UNSPECIFIED FETUS: ICD-10-CM
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
## 2024-05-28 DIAGNOSIS — Z34.90 EARLY STAGE OF PREGNANCY: ICD-10-CM

## 2024-05-28 DIAGNOSIS — O46.8X1 SUBCHORIONIC HEMATOMA IN FIRST TRIMESTER, SINGLE OR UNSPECIFIED FETUS: ICD-10-CM

## 2024-05-28 PROCEDURE — 99213 OFFICE O/P EST LOW 20 MIN: CPT | Performed by: NURSE PRACTITIONER
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
## 2024-06-17 ENCOUNTER — ULTRASOUND (OUTPATIENT)
Dept: OBGYN CLINIC | Facility: CLINIC | Age: 19
End: 2024-06-17

## 2024-06-17 VITALS
DIASTOLIC BLOOD PRESSURE: 80 MMHG | BODY MASS INDEX: 28.25 KG/M2 | SYSTOLIC BLOOD PRESSURE: 132 MMHG | HEIGHT: 66 IN | WEIGHT: 175.8 LBS

## 2024-06-17 DIAGNOSIS — Z36.9 ANTENATAL SCREENING ENCOUNTER: ICD-10-CM

## 2024-06-17 DIAGNOSIS — O36.80X0 ENCOUNTER TO DETERMINE FETAL VIABILITY OF PREGNANCY, SINGLE OR UNSPECIFIED FETUS: Primary | ICD-10-CM

## 2024-06-17 NOTE — PROGRESS NOTES
Serial transvaginal images were obtained through the gravid maternal uterus. The patient's LMP was 3/21/2024 with an MARIN of  12/26/2024 and a gestational age of  12w4d (based on LMP).  A previous ultrasound was performed consistent with a gestational age of 10w0d and an MARIN of 01/13/2025 by ultrasound. The indication for today's examination is to confirm fetal size and viability.     CRL=               4.03cm    11w0d       MARIN 01/06/2025  YS=                 4.0mm  FHR=               171bpm     The uterus demonstrates a small subchorionic bleed, 1cm. The bilateral ovaries appear within normal limits.      Rt Ovary=3.03 x 1.52 x 2.17cm  Lt. Ovary=2.53 x 1.56 x 1.75cm     Impression:Single, viable IUP.     Adry Zeng RDMS     GE [id number]-[id number] transvaginal transducer Serial #[id number] was used to perform the examination today and subsequently followed with high level disinfection utilizing Trophon EPR procedure.     [facility]  [facility]  [address] [place] [address], [place] [address]  Phone  [phone]  Fax  [phone]

## 2024-06-18 ENCOUNTER — TELEPHONE (OUTPATIENT)
Age: 19
End: 2024-06-18

## 2024-06-25 ENCOUNTER — INITIAL PRENATAL (OUTPATIENT)
Dept: OBGYN CLINIC | Facility: CLINIC | Age: 19
End: 2024-06-25

## 2024-06-25 VITALS
HEIGHT: 66 IN | WEIGHT: 175 LBS | BODY MASS INDEX: 28.12 KG/M2 | DIASTOLIC BLOOD PRESSURE: 70 MMHG | SYSTOLIC BLOOD PRESSURE: 100 MMHG

## 2024-06-25 DIAGNOSIS — Z34.01 ENCOUNTER FOR SUPERVISION OF NORMAL FIRST PREGNANCY IN FIRST TRIMESTER: Primary | ICD-10-CM

## 2024-06-25 DIAGNOSIS — Z13.71 SCREENING FOR GENETIC DISEASE CARRIER STATUS: ICD-10-CM

## 2024-06-25 PROCEDURE — NOBC

## 2024-06-25 NOTE — PATIENT INSTRUCTIONS
Congratulations on your pregnancy!  We thank you for allowing us to participate in your care.    NEXT STEPS    Go to the lab to have your prenatal blood work competed, if you have not already done so.  We ask that you have this blood work done prior to your first routine prenatal appointment.  There is a listing of [facility] Laboratories and locations in your prenatal folder. You may also visit [email or web address] or call [phone].   Please be aware that some insurance companies may require you to go to a specific lab ([employer] or [employer]). You can verify this by contacting your insurance company.   If you have decided to be screened for CF and SMA genetic testing, these tests require prior authorization and scheduling.  Prior Authorization is not a guarantee of payment. There may be out of pocket expenses that includes copays, deductibles and or coinsurance for your individual plan.  Please call [phone] if our team has not contacted you in 7 business days.  If you have decided to have genetic testing done at Maternal Fetal Medicine, that will be scheduled by [facility]. You may have already scheduled this appointment.  If not, please call their office to schedule this appointment.  Based on the referral placed by our office, they will know how to schedule you appropriately.    Contact information for Maternal Fetal Medicine is located in your prenatal folder. The main phone number to their office is [phone]..   Return to our office for your first routine prenatal visit.   Some offices have multiple locations. Always check the address of your appointment to ensure you are going to the correct office.   If you experience any problems or concerns, call the office directly.  Remember to only use [employer] for none urgent concerns or questions.  Our doctors deliver at [facility] in [place]. The address is provided below.     [facility]   [address] [place] [address]    Click  on the links below to review our Pregnancy Essential Guide.  [facility] Pregnancy Essentials Guide  . [facility] ([email or web address])     Click on the link below to review . [facility] Lab locations.  [facility] Lab Locations    [employer] resource  [employer] is a tool to connect you to community resources you may need.    Thank you,  Felisha VARGAS LPN  OB Nurse Navigator

## 2024-06-25 NOTE — PROGRESS NOTES
OB INTAKE INTERVIEW 2024    Patient is 18 y.o. who presents for OB intake at 12w1d.  She is not accompanied by anyone during this encounter.  The father of her baby (Erasmo Meneses) is involved in the pregnancy and he is 21 years old.      Patient's last menstrual period was 2024 (exact date).  Ultrasound: Measured 11 weeks 0 days on 2024  Estimated Date of Delivery: 25 changed by dating ultrasound.    Signs/Symptoms of Pregnancy  Current pregnancy symptoms: fatigue  Constipation no  Headaches YES, mild  Cramping YES  Spotting no  PICA cravings no    Diabetes-  Body mass index is 28.25 kg/m².  If patient has 1 or more, please order early 1 hour GTT  History of GDM no  BMI >35 no  History of PCOS or current metformin use-patient unsure   History of LGA/macrosomic infant (4000g/9lbs) no    If patient has 2 or more, please order early 1 hour GTT  BMI>30 no  AMA no  First degree relative with type 2 diabetes no  History of chronic HTN, hyperlipidemia, elevated A1C no  High risk race (, , ,  or ) no    Hypertension- if you answer yes to any of the following, please order baseline preeclampsia labs (cbc, comprehensive metabolic panel, urine protein creatinine ratio, uric acid)  History of of chronic HTN no  History of gestational HTN no  History of preeclampsia, eclampsia, or HELLP syndrome no  History of diabetes no  History of lupus, autoimmune disease, kidney disease no    Thyroid- if yes order TSH with reflex T4  History of thyroid disease no    Bleeding Disorder or Hx of DVT-patient or first degree relative with history of. Order the following if not done previously.   (Factor V, antithrombin III, prothrombin gene mutation, protein C and S Ag, lupus anticoagulant, anticardiolipin, beta-2 glycoprotein)   no    OB/GYN-  History of abnormal pap smear no       Date of last pap smear Not on file-not of age  History of HPV no  History  of Herpes/HSV no  History of other STI (gonorrhea, chlamydia, trich) no  History of prior  no  History of prior  no  History of  delivery prior to 36 weeks 6 days no  History of blood transfusion no  Ok for blood transfusion YES    Substance screening-   History of tobacco use no  Currently using tobacco no  Currently using alcohol no  Presently using drugs no  Past drug use  no  IV drug use- no  Partner drug use no  Parent/Family drug use no  Substance Use Screen Level No risk    MRSA Screening-   Does the pt have a hx of MRSA? no    Immunizations:  Discussed Tdap vaccine:  YES  Discussed COVID Vaccine:  YES    Genetic/MFM-  Do you or your partner have a history of any of the following in yourselves or first degree relatives?  Cystic fibrosis no  Spinal muscular atrophy no  Hemoglobinopathy/Sickle Cell/Thalassemia no  Fragile X Intellectual Disability no    If yes, discuss Carrier Screening and recommend consultation with MFM/Genetic Counseling and place specific [facility] Referral for.    If no, discuss Carrier Screening being completed once in a lifetime as a standard of care lab test. Place orders for Cystic Fibrosis Gene Test (FOM695) and Spinal Muscular Atrophy DNA (TWN4004).  Patient was informed that prior authorization needs to be completed for these tests and this may take 7-10 business days.  Patient does not desire testing for Cystic Fibrosis and Spinal Muscular Atrophy.    Appointment for Nuchal Translucency Ultrasound at [facility] is scheduled for 2024.    Interview education  [facility] Pregnancy Essentials Book reviewed, discussed and attached to their AVS:  YES    Nurse/Family Partnership-patient may qualify YES; referral placed YES     Prenatal lab work scripts YES    Extra labs ordered: Hgb Fractionation Cascade    Aspirin/Preeclampsia Screen    Risk Level Risk Factor Recommendation   LOW Prior Uncomplicated full-term delivery no No Aspirin recommendation        MODERATE Nulliparity YES  "Recommend low-dose aspirin if     BMI>30 no 2 or more moderate risk factors    Family History Preeclampsia (mother/sister) no     35yr old or greater no      or Low Socioeconomic no     IVF Pregnancy  YES     Personal History Risks (low birth weight, prior adverse preg outcome, >10yr preg interval) no         HIGH History of Preeclampsia no Recommend low-dose aspirin if     Multifetal gestation no 1 or more high risk factors    Chronic HTN no     Type 1 or 2 Diabetes no     Renal Disease no     Autoimmune Disease  no      Contraindications to ASA therapy:  NSAID/ ASA allergy: no  Nasal polyps: no  Asthma with history of ASA induced bronchospasm: no  Relative contraindications:  History of GI bleed: no  Active peptic ulcer disease: no  Severe hepatic dysfunction: no    Patient does not meet recommendation to take ASA 162mg during this pregnancy from 12-36wks to lower her risk of preeclampsia.      Mental Health:  History of depression: YES  History of anxiety: YES  EPDS Screen:  Negative   Score:  0      The patient has a history now or in prior pregnancy notable for: No current or previous pregnancy complications.    Details that I feel the provider should be aware of: Was hospitalized for depression in 2022.  Patient states she is doing \"fine\" now.  Not currently taking medication for depression.  No additional concerns.    PN1 visit scheduled. The patient was oriented to our practice and the navigator role.  Reviewed delivering physicians and [facility] for delivery. All questions were answered.    Interviewed by: FEDERICO BONILLA LPN    "

## 2024-06-28 ENCOUNTER — APPOINTMENT (OUTPATIENT)
Dept: LAB | Facility: CLINIC | Age: 19
End: 2024-06-28
Payer: COMMERCIAL

## 2024-06-28 DIAGNOSIS — Z34.01 ENCOUNTER FOR SUPERVISION OF NORMAL FIRST PREGNANCY IN FIRST TRIMESTER: ICD-10-CM

## 2024-06-28 LAB
ABO GROUP BLD: NORMAL
BACTERIA UR QL AUTO: ABNORMAL /HPF
BASOPHILS # BLD AUTO: 0.04 THOUSANDS/ÂΜL (ref 0–0.1)
BASOPHILS NFR BLD AUTO: 1 % (ref 0–1)
BILIRUB UR QL STRIP: NEGATIVE
BLD GP AB SCN SERPL QL: NEGATIVE
CLARITY UR: CLEAR
COLOR UR: YELLOW
EOSINOPHIL # BLD AUTO: 0.04 THOUSAND/ÂΜL (ref 0–0.61)
EOSINOPHIL NFR BLD AUTO: 1 % (ref 0–6)
ERYTHROCYTE [DISTWIDTH] IN BLOOD BY AUTOMATED COUNT: 12.2 % (ref 11.6–15.1)
GLUCOSE UR STRIP-MCNC: NEGATIVE MG/DL
HCT VFR BLD AUTO: 44.2 % (ref 34.8–46.1)
HGB BLD-MCNC: 14.7 G/DL (ref 11.5–15.4)
HGB UR QL STRIP.AUTO: NEGATIVE
HIV 1+2 AB+HIV1 P24 AG SERPL QL IA: NORMAL
HIV 2 AB SERPL QL IA: NORMAL
HIV1 AB SERPL QL IA: NORMAL
HIV1 P24 AG SERPL QL IA: NORMAL
IMM GRANULOCYTES # BLD AUTO: 0.03 THOUSAND/UL (ref 0–0.2)
IMM GRANULOCYTES NFR BLD AUTO: 0 % (ref 0–2)
KETONES UR STRIP-MCNC: NEGATIVE MG/DL
LEUKOCYTE ESTERASE UR QL STRIP: ABNORMAL
LYMPHOCYTES # BLD AUTO: 1.55 THOUSANDS/ÂΜL (ref 0.6–4.47)
LYMPHOCYTES NFR BLD AUTO: 21 % (ref 14–44)
MCH RBC QN AUTO: 32.7 PG (ref 26.8–34.3)
MCHC RBC AUTO-ENTMCNC: 33.3 G/DL (ref 31.4–37.4)
MCV RBC AUTO: 98 FL (ref 82–98)
MONOCYTES # BLD AUTO: 0.51 THOUSAND/ÂΜL (ref 0.17–1.22)
MONOCYTES NFR BLD AUTO: 7 % (ref 4–12)
MUCOUS THREADS UR QL AUTO: ABNORMAL
NEUTROPHILS # BLD AUTO: 5.14 THOUSANDS/ÂΜL (ref 1.85–7.62)
NEUTS SEG NFR BLD AUTO: 70 % (ref 43–75)
NITRITE UR QL STRIP: POSITIVE
NON-SQ EPI CELLS URNS QL MICRO: ABNORMAL /HPF
NRBC BLD AUTO-RTO: 0 /100 WBCS
PH UR STRIP.AUTO: 6.5 [PH]
PLATELET # BLD AUTO: 218 THOUSANDS/UL (ref 149–390)
PMV BLD AUTO: 13 FL (ref 8.9–12.7)
PROT UR STRIP-MCNC: ABNORMAL MG/DL
RBC # BLD AUTO: 4.49 MILLION/UL (ref 3.81–5.12)
RBC #/AREA URNS AUTO: ABNORMAL /HPF
RH BLD: POSITIVE
RUBV IGG SERPL IA-ACNC: 14.1 IU/ML
SP GR UR STRIP.AUTO: 1.02 (ref 1–1.03)
SPECIMEN EXPIRATION DATE: NORMAL
TREPONEMA PALLIDUM IGG+IGM AB [PRESENCE] IN SERUM OR PLASMA BY IMMUNOASSAY: NORMAL
UROBILINOGEN UR STRIP-ACNC: <2 MG/DL
WBC # BLD AUTO: 7.31 THOUSAND/UL (ref 4.31–10.16)
WBC #/AREA URNS AUTO: ABNORMAL /HPF

## 2024-06-28 PROCEDURE — 85025 COMPLETE CBC W/AUTO DIFF WBC: CPT

## 2024-06-28 PROCEDURE — 87340 HEPATITIS B SURFACE AG IA: CPT

## 2024-06-28 PROCEDURE — 86901 BLOOD TYPING SEROLOGIC RH(D): CPT

## 2024-06-28 PROCEDURE — 86850 RBC ANTIBODY SCREEN: CPT

## 2024-06-28 PROCEDURE — 86780 TREPONEMA PALLIDUM: CPT

## 2024-06-28 PROCEDURE — 87389 HIV-1 AG W/HIV-1&-2 AB AG IA: CPT

## 2024-06-28 PROCEDURE — 87086 URINE CULTURE/COLONY COUNT: CPT

## 2024-06-28 PROCEDURE — 83020 HEMOGLOBIN ELECTROPHORESIS: CPT

## 2024-06-28 PROCEDURE — 86803 HEPATITIS C AB TEST: CPT

## 2024-06-28 PROCEDURE — 86706 HEP B SURFACE ANTIBODY: CPT

## 2024-06-28 PROCEDURE — 86762 RUBELLA ANTIBODY: CPT

## 2024-06-28 PROCEDURE — 81001 URINALYSIS AUTO W/SCOPE: CPT

## 2024-06-28 PROCEDURE — 86900 BLOOD TYPING SEROLOGIC ABO: CPT

## 2024-06-28 PROCEDURE — 36415 COLL VENOUS BLD VENIPUNCTURE: CPT

## 2024-06-29 PROBLEM — Z28.39 RUBELLA NON-IMMUNE STATUS, ANTEPARTUM: Status: ACTIVE | Noted: 2024-06-29

## 2024-06-29 PROBLEM — O09.899 RUBELLA NON-IMMUNE STATUS, ANTEPARTUM: Status: ACTIVE | Noted: 2024-06-29

## 2024-06-29 LAB
BACTERIA UR CULT: NORMAL
HBV SURFACE AB SER-ACNC: 8.02 MIU/ML
HBV SURFACE AG SER QL: NORMAL
HCV AB SER QL: NORMAL

## 2024-07-02 ENCOUNTER — ROUTINE PRENATAL (OUTPATIENT)
Dept: PERINATAL CARE | Facility: OTHER | Age: 19
End: 2024-07-02
Payer: COMMERCIAL

## 2024-07-02 VITALS
SYSTOLIC BLOOD PRESSURE: 124 MMHG | HEART RATE: 95 BPM | DIASTOLIC BLOOD PRESSURE: 62 MMHG | HEIGHT: 66 IN | WEIGHT: 175 LBS | BODY MASS INDEX: 28.12 KG/M2

## 2024-07-02 DIAGNOSIS — Z36.9 ANTENATAL SCREENING ENCOUNTER: ICD-10-CM

## 2024-07-02 DIAGNOSIS — J45.909 ASTHMA AFFECTING PREGNANCY IN FIRST TRIMESTER: ICD-10-CM

## 2024-07-02 DIAGNOSIS — Z36.0 ENCOUNTER FOR ANTENATAL SCREENING FOR CHROMOSOMAL ANOMALIES: Primary | ICD-10-CM

## 2024-07-02 DIAGNOSIS — O99.511 ASTHMA AFFECTING PREGNANCY IN FIRST TRIMESTER: ICD-10-CM

## 2024-07-02 DIAGNOSIS — Z36.82 ENCOUNTER FOR ANTENATAL SCREENING FOR NUCHAL TRANSLUCENCY: ICD-10-CM

## 2024-07-02 DIAGNOSIS — Z3A.13 13 WEEKS GESTATION OF PREGNANCY: ICD-10-CM

## 2024-07-02 PROCEDURE — 99243 OFF/OP CNSLTJ NEW/EST LOW 30: CPT | Performed by: OBSTETRICS & GYNECOLOGY

## 2024-07-02 PROCEDURE — 76813 OB US NUCHAL MEAS 1 GEST: CPT | Performed by: OBSTETRICS & GYNECOLOGY

## 2024-07-02 PROCEDURE — 76801 OB US < 14 WKS SINGLE FETUS: CPT | Performed by: OBSTETRICS & GYNECOLOGY

## 2024-07-02 PROCEDURE — 36415 COLL VENOUS BLD VENIPUNCTURE: CPT | Performed by: OBSTETRICS & GYNECOLOGY

## 2024-07-02 NOTE — LETTER
July 2, 2024     Helena Olmstead MD  [address]  [address]  [facility] [place] [facility] [address]    Patient: Julia Haines   YOB: 2005   Date of Visit: 7/2/2024       Dear Dr. Olmstead:    Thank you for referring Julia Haines to me for evaluation. Below are my notes for this consultation.    If you have questions, please do not hesitate to call me. I look forward to following your patient along with you.         Sincerely,        Aureliano Batista MD        CC: No Recipients    Aureliano Batista MD  7/2/2024  2:12 PM  Sign when Signing Visit  Please refer to the [facility] ultrasound report in Ob Procedures for additional information regarding today's visit

## 2024-07-02 NOTE — PROGRESS NOTES
Patient chose to have [facility] [id number] Non-Invasive Prenatal Screen [id number] PLUS w/ SCA, WITH fetal sex.  Patient choose to be billed through insurance.     Patient given brochure and is aware [facility] will contact patient's insurance and coordinate coverage.  Provided [facility] contact information. General inquiries [phone], Cost estimates [phone] and [facility] Billing [phone]. Website [email or web address] [email or web address] App.     Blood collection tubes labeled with patient identifiers (name, medical record number, and date of birth).     Filled out [facility] order form. Patient chose to have blood drawn in our office at time of visit. NIPS was drawn from left arm with a straight needle by Millie YARBROUGH .      Maternal Fetal Medicine will have results in approximately 5-7 business days and will call patient or notify via [employer].  Patient aware viewing lab result online will reveal fetal sex if ordered.    Patient verbalized understanding of all instructions and no questions at this time.

## 2024-07-04 LAB
HGB A MFR BLD: 2.5 % (ref 1.8–3.2)
HGB A MFR BLD: 97.5 % (ref 96.4–98.8)
HGB F MFR BLD: 0 % (ref 0–2)
HGB FRACT BLD-IMP: NORMAL
HGB S MFR BLD: 0 %

## 2024-07-07 LAB
CFDNA.FET/CFDNA.TOTAL SFR FETUS: NORMAL %
CITATION REF LAB TEST: NORMAL
FET 13+18+21+X+Y ANEUP PLAS.CFDNA: NEGATIVE
FET CHR 21 TS PLAS.CFDNA QL: NEGATIVE
FET CHR 21 TS PLAS.CFDNA QL: NEGATIVE
FET MS X RISK WBC.DNA+CFDNA QL: NOT DETECTED
FET SEX PLAS.CFDNA DOSAGE CFDNA: NORMAL
FET TS 13 RISK PLAS.CFDNA QL: NEGATIVE
FET X + Y ANEUP RISK PLAS.CFDNA SEQ-IMP: NOT DETECTED
GA EST FROM CONCEPTION DATE: NORMAL D
GESTATIONAL AGE > 9:: YES
LAB DIRECTOR NAME PROVIDER: NORMAL
LAB DIRECTOR NAME PROVIDER: NORMAL
LABORATORY COMMENT REPORT: NORMAL
LIMITATIONS OF THE TEST: NORMAL
NEGATIVE PREDICTIVE VALUE: NORMAL
PERFORMANCE CHARACTERISTICS: NORMAL
POSITIVE PREDICTIVE VALUE: NORMAL
REF LAB TEST METHOD: NORMAL
SL AMB NOTE:: NORMAL
TEST PERFORMANCE INFO SPEC: NORMAL

## 2024-08-19 ENCOUNTER — TELEPHONE (OUTPATIENT)
Dept: OBGYN CLINIC | Facility: CLINIC | Age: 19
End: 2024-08-19

## 2024-08-19 NOTE — TELEPHONE ENCOUNTER
Patient has not been seen for any routine prenatal care following OB intake.  Patient cancelled scheduled appt on 7/5/2024.  Called patient to follow up.  Schedule appt for PN1 on 8/23/2024.

## 2024-08-20 ENCOUNTER — ROUTINE PRENATAL (OUTPATIENT)
Dept: PERINATAL CARE | Facility: OTHER | Age: 19
End: 2024-08-20
Payer: COMMERCIAL

## 2024-08-20 VITALS
SYSTOLIC BLOOD PRESSURE: 112 MMHG | DIASTOLIC BLOOD PRESSURE: 70 MMHG | WEIGHT: 175.2 LBS | BODY MASS INDEX: 28.16 KG/M2 | HEART RATE: 98 BPM | HEIGHT: 66 IN

## 2024-08-20 DIAGNOSIS — Z3A.20 20 WEEKS GESTATION OF PREGNANCY: ICD-10-CM

## 2024-08-20 DIAGNOSIS — Z36.3 ENCOUNTER FOR ANTENATAL SCREENING FOR MALFORMATION: Primary | ICD-10-CM

## 2024-08-20 DIAGNOSIS — Z36.86 ENCOUNTER FOR ANTENATAL SCREENING FOR CERVICAL LENGTH: ICD-10-CM

## 2024-08-20 PROCEDURE — 99212 OFFICE O/P EST SF 10 MIN: CPT | Performed by: STUDENT IN AN ORGANIZED HEALTH CARE EDUCATION/TRAINING PROGRAM

## 2024-08-20 PROCEDURE — 76805 OB US >/= 14 WKS SNGL FETUS: CPT | Performed by: STUDENT IN AN ORGANIZED HEALTH CARE EDUCATION/TRAINING PROGRAM

## 2024-08-20 PROCEDURE — 76817 TRANSVAGINAL US OBSTETRIC: CPT | Performed by: STUDENT IN AN ORGANIZED HEALTH CARE EDUCATION/TRAINING PROGRAM

## 2024-08-20 NOTE — LETTER
August 20, 2024     Patient: Julia Haines  YOB: 2005  Date of Visit: 8/20/2024      To Whom it May Concern:    Julia Haines is under my professional care. Julia was seen in my office on 8/20/2024. She is pregnant and has an estimated date of delivery of January 6, 2025.    If you have any questions or concerns, please don't hesitate to call.         Sincerely,          Afshan Robles MD        CC: No Recipients   [No Acute Distress] : no acute distress [Normal Voice/Communication] : normal voice/communication [PERRL] : pupils equal round and reactive to light [EOMI] : extraocular movements intact [Normal Oropharynx] : the oropharynx was normal [Normal] : normal rate, regular rhythm, normal S1 and S2 and no murmur heard [No Edema] : there was no peripheral edema [Soft] : abdomen soft [Non Tender] : non-tender [Non-distended] : non-distended [No Masses] : no abdominal mass palpated [No HSM] : no HSM [Normal Bowel Sounds] : normal bowel sounds [No Spinal Tenderness] : no spinal tenderness [No Rash] : no rash [No Focal Deficits] : no focal deficits [Normal Affect] : the affect was normal [Alert and Oriented x3] : oriented to person, place, and time [Normal Mood] : the mood was normal [Normal Insight/Judgement] : insight and judgment were intact [de-identified] : He has right subconjunctival hemorrhage.  He does have some periorbital ecchymosis.  He has some ecchymosis above right side of lip with mild right lip swelling.  No lacerations [de-identified] : Right lower extremity along medial calf at lower end of previous incision he has a 2 cm x 1 cm open wound.  There is no surrounding erythema.  There is no discharge.  There is no foul odor.

## 2024-08-20 NOTE — PROGRESS NOTES
"[facility]: Ms. Haines was seen today for anatomic survey and cervical length screening ultrasound.  See ultrasound report under \"OB Procedures\" tab.      MDM:   I. Diagnoses/Problems addressed:  Self limited or minor problem: uncomplicated pregnancy  II.  Data: I reviewed 1 lab tests ordered by another provider.  III.  Risk of morbidity:  minimal    Please don't hesitate to contact our office with any concerns or questions.  -Afshan Robles MD      "

## 2024-08-20 NOTE — PROGRESS NOTES
Ultrasound Probe Disinfection    A transvaginal ultrasound was performed.   Prior to use, disinfection was performed with High Level Disinfection Process ([employer]).  Probe serial number F1: [id number]  was used.    Chaperone: Millie Danielle, Medical Assistant  Sg Schaffer RDMS

## 2024-08-23 ENCOUNTER — TELEPHONE (OUTPATIENT)
Dept: OBGYN CLINIC | Facility: CLINIC | Age: 19
End: 2024-08-23

## 2024-08-23 ENCOUNTER — INITIAL PRENATAL (OUTPATIENT)
Dept: OBGYN CLINIC | Facility: CLINIC | Age: 19
End: 2024-08-23

## 2024-08-23 ENCOUNTER — APPOINTMENT (OUTPATIENT)
Dept: LAB | Facility: CLINIC | Age: 19
End: 2024-08-23
Payer: COMMERCIAL

## 2024-08-23 VITALS
OXYGEN SATURATION: 98 % | HEART RATE: 93 BPM | WEIGHT: 176 LBS | HEIGHT: 66 IN | SYSTOLIC BLOOD PRESSURE: 108 MMHG | DIASTOLIC BLOOD PRESSURE: 74 MMHG | BODY MASS INDEX: 28.28 KG/M2

## 2024-08-23 DIAGNOSIS — Z34.02 ENCOUNTER FOR SUPERVISION OF NORMAL FIRST PREGNANCY IN SECOND TRIMESTER: ICD-10-CM

## 2024-08-23 DIAGNOSIS — Z34.02 ENCOUNTER FOR SUPERVISION OF NORMAL FIRST PREGNANCY IN SECOND TRIMESTER: Primary | ICD-10-CM

## 2024-08-23 DIAGNOSIS — Z28.39 RUBELLA NON-IMMUNE STATUS, ANTEPARTUM: ICD-10-CM

## 2024-08-23 DIAGNOSIS — Z86.59 HISTORY OF DEPRESSION: ICD-10-CM

## 2024-08-23 DIAGNOSIS — Z11.3 SCREENING EXAMINATION FOR STD (SEXUALLY TRANSMITTED DISEASE): ICD-10-CM

## 2024-08-23 DIAGNOSIS — O09.899 RUBELLA NON-IMMUNE STATUS, ANTEPARTUM: ICD-10-CM

## 2024-08-23 PROCEDURE — PNV: Performed by: NURSE PRACTITIONER

## 2024-08-23 PROCEDURE — 87591 N.GONORRHOEAE DNA AMP PROB: CPT | Performed by: NURSE PRACTITIONER

## 2024-08-23 PROCEDURE — 82105 ALPHA-FETOPROTEIN SERUM: CPT

## 2024-08-23 PROCEDURE — 87491 CHLMYD TRACH DNA AMP PROBE: CPT | Performed by: NURSE PRACTITIONER

## 2024-08-23 PROCEDURE — 36415 COLL VENOUS BLD VENIPUNCTURE: CPT

## 2024-08-23 NOTE — TELEPHONE ENCOUNTER
Overall how are you doing? Patient states she is doing well.    Compliant with routine OB care appointments? No, had PN1 today and additional prenatal appts are scheduled.    Have you completed your 1st trimester labs? Yes    If you had NIPS with MFM, do you have a order for MSAFP? Yes, patient has order and was reminded to have done in the appropriate time frame.   Can be completed 15w-22w9d, ideally 16w-18w    Have you seen MFM and do you have your detailed US scheduled? Yes, detailed US was done 8/20/2024.    Pregnancy Education-have you had a chance to review the classes offered and registered? No, reviewed prenatal classes and instructions for registering with patient.     EPDS Score:  0

## 2024-08-23 NOTE — PROGRESS NOTES
Initial OB visit  17 yo  at 20w4d gestation.  Rubella non-immune (vaccinate post-delivery), hx of ADHD/anxiety/depression (non-medicated, doing very well), developmental delay    Ob intake/ histories reviewed in detail.    PN labs normal   Discussed rubella non-immunity  MFM, 13w , 24, normal NT  NIPT low risk; having a BOY!  20w anatomy: NL    + quickening; denies LOF/VB or pain.  S=D, normal FHTs, normal BP    MSAFP order placed- will do today.  RV 4w

## 2024-08-26 LAB
C TRACH DNA SPEC QL NAA+PROBE: NEGATIVE
N GONORRHOEA DNA SPEC QL NAA+PROBE: NEGATIVE

## 2024-08-28 LAB
2ND TRIMESTER 4 SCREEN SERPL-IMP: NORMAL
AFP ADJ MOM SERPL: 1.37
AFP INTERP AMN-IMP: NORMAL
AFP INTERP SERPL-IMP: NORMAL
AFP INTERP SERPL-IMP: NORMAL
AFP SERPL-MCNC: 77.5 NG/ML
AGE AT DELIVERY: 21.3 YR
GA METHOD: NORMAL
GA: 20.9 WEEKS
IDDM PATIENT QL: NO
MULTIPLE PREGNANCY: NO
NEURAL TUBE DEFECT RISK FETUS: 3920 %

## 2024-09-18 ENCOUNTER — ROUTINE PRENATAL (OUTPATIENT)
Dept: OBGYN CLINIC | Facility: CLINIC | Age: 19
End: 2024-09-18

## 2024-09-18 VITALS
HEIGHT: 66 IN | DIASTOLIC BLOOD PRESSURE: 66 MMHG | BODY MASS INDEX: 29.22 KG/M2 | OXYGEN SATURATION: 95 % | HEART RATE: 97 BPM | SYSTOLIC BLOOD PRESSURE: 104 MMHG | WEIGHT: 181.8 LBS

## 2024-09-18 DIAGNOSIS — Z34.02 ENCOUNTER FOR SUPERVISION OF NORMAL FIRST PREGNANCY IN SECOND TRIMESTER: Primary | ICD-10-CM

## 2024-09-18 DIAGNOSIS — O09.899 RUBELLA NON-IMMUNE STATUS, ANTEPARTUM: ICD-10-CM

## 2024-09-18 DIAGNOSIS — Z28.39 RUBELLA NON-IMMUNE STATUS, ANTEPARTUM: ICD-10-CM

## 2024-09-18 DIAGNOSIS — Z86.59 HISTORY OF DEPRESSION: ICD-10-CM

## 2024-09-18 PROCEDURE — PNV: Performed by: NURSE PRACTITIONER

## 2024-09-18 RX ORDER — AMOXICILLIN 500 MG/1
500 TABLET, FILM COATED ORAL
COMMUNITY
Start: 2024-09-13

## 2024-09-18 RX ORDER — CHLORHEXIDINE GLUCONATE ORAL RINSE 1.2 MG/ML
SOLUTION DENTAL
COMMUNITY
Start: 2024-09-13

## 2024-09-18 NOTE — PROGRESS NOTES
17 yo  at 24w1d gestation.  Rubella non-immune (vaccinate post-delivery), hx of ADHD/anxiety/depression (non-medicated, doing very well), developmental delay.    NIPT low risk.  20w anatomy normal.  She is feeling well.    Baby is active, denies leakage of fluid, vaginal bleeding or uterine contractions.  S=D, normal FHTs, normal BP    28w lab order provided.  RV 4w         Gera

## 2024-10-04 ENCOUNTER — APPOINTMENT (OUTPATIENT)
Dept: LAB | Facility: CLINIC | Age: 19
End: 2024-10-04
Payer: COMMERCIAL

## 2024-10-04 DIAGNOSIS — Z34.02 ENCOUNTER FOR SUPERVISION OF NORMAL FIRST PREGNANCY IN SECOND TRIMESTER: ICD-10-CM

## 2024-10-04 LAB
BASOPHILS # BLD AUTO: 0.03 THOUSANDS/ΜL (ref 0–0.1)
BASOPHILS NFR BLD AUTO: 0 % (ref 0–1)
EOSINOPHIL # BLD AUTO: 0.06 THOUSAND/ΜL (ref 0–0.61)
EOSINOPHIL NFR BLD AUTO: 1 % (ref 0–6)
ERYTHROCYTE [DISTWIDTH] IN BLOOD BY AUTOMATED COUNT: 13.1 % (ref 11.6–15.1)
GLUCOSE 1H P 50 G GLC PO SERPL-MCNC: 157 MG/DL (ref 70–134)
HCT VFR BLD AUTO: 38.5 % (ref 34.8–46.1)
HGB BLD-MCNC: 12.8 G/DL (ref 11.5–15.4)
IMM GRANULOCYTES # BLD AUTO: 0.19 THOUSAND/UL (ref 0–0.2)
IMM GRANULOCYTES NFR BLD AUTO: 2 % (ref 0–2)
LYMPHOCYTES # BLD AUTO: 1.96 THOUSANDS/ΜL (ref 0.6–4.47)
LYMPHOCYTES NFR BLD AUTO: 17 % (ref 14–44)
MCH RBC QN AUTO: 32.1 PG (ref 26.8–34.3)
MCHC RBC AUTO-ENTMCNC: 33.2 G/DL (ref 31.4–37.4)
MCV RBC AUTO: 97 FL (ref 82–98)
MONOCYTES # BLD AUTO: 0.64 THOUSAND/ΜL (ref 0.17–1.22)
MONOCYTES NFR BLD AUTO: 6 % (ref 4–12)
NEUTROPHILS # BLD AUTO: 8.85 THOUSANDS/ΜL (ref 1.85–7.62)
NEUTS SEG NFR BLD AUTO: 74 % (ref 43–75)
NRBC BLD AUTO-RTO: 0 /100 WBCS
PLATELET # BLD AUTO: 201 THOUSANDS/UL (ref 149–390)
PMV BLD AUTO: 13.5 FL (ref 8.9–12.7)
RBC # BLD AUTO: 3.99 MILLION/UL (ref 3.81–5.12)
TREPONEMA PALLIDUM IGG+IGM AB [PRESENCE] IN SERUM OR PLASMA BY IMMUNOASSAY: NORMAL
WBC # BLD AUTO: 11.73 THOUSAND/UL (ref 4.31–10.16)

## 2024-10-04 PROCEDURE — 86780 TREPONEMA PALLIDUM: CPT

## 2024-10-04 PROCEDURE — 85025 COMPLETE CBC W/AUTO DIFF WBC: CPT

## 2024-10-04 PROCEDURE — 82950 GLUCOSE TEST: CPT

## 2024-10-04 PROCEDURE — 36415 COLL VENOUS BLD VENIPUNCTURE: CPT

## 2024-10-07 DIAGNOSIS — O99.810 ABNORMAL GLUCOSE TOLERANCE AFFECTING PREGNANCY, ANTEPARTUM: Primary | ICD-10-CM

## 2024-10-07 NOTE — RESULT ENCOUNTER NOTE
Please inform patient that her 1 hour glucose testing was elevated at 157.  This means she will need to do a 3 hour glucose tolerance test to rule out gestational diabetes.  Order placed.  Please instruct patient.

## 2024-10-08 ENCOUNTER — TELEPHONE (OUTPATIENT)
Age: 19
End: 2024-10-08

## 2024-10-08 NOTE — TELEPHONE ENCOUNTER
----- Message from CAMERON Diaz sent at 10/7/2024  2:00 PM EDT -----  Please inform patient that her 1 hour glucose testing was elevated at 157.  This means she will need to do a 3 hour glucose tolerance test to rule out gestational diabetes.  Order placed.  Please instruct patient.

## 2024-10-11 ENCOUNTER — APPOINTMENT (OUTPATIENT)
Dept: LAB | Facility: CLINIC | Age: 19
End: 2024-10-11
Payer: COMMERCIAL

## 2024-10-11 ENCOUNTER — APPOINTMENT (OUTPATIENT)
Dept: LAB | Facility: HOSPITAL | Age: 19
End: 2024-10-11
Payer: COMMERCIAL

## 2024-10-11 DIAGNOSIS — O99.810 ABNORMAL GLUCOSE TOLERANCE AFFECTING PREGNANCY, ANTEPARTUM: ICD-10-CM

## 2024-10-11 LAB
GLUCOSE 1H P 100 G GLC PO SERPL-MCNC: 159 MG/DL (ref 65–179)
GLUCOSE 2H P 100 G GLC PO SERPL-MCNC: 160 MG/DL (ref 65–154)
GLUCOSE 3H P 100 G GLC PO SERPL-MCNC: 141 MG/DL (ref 65–139)
GLUCOSE P FAST SERPL-MCNC: 83 MG/DL (ref 65–94)

## 2024-10-11 PROCEDURE — 82951 GLUCOSE TOLERANCE TEST (GTT): CPT

## 2024-10-11 PROCEDURE — 36415 COLL VENOUS BLD VENIPUNCTURE: CPT

## 2024-10-11 PROCEDURE — 82952 GTT-ADDED SAMPLES: CPT

## 2024-10-14 ENCOUNTER — TELEPHONE (OUTPATIENT)
Age: 19
End: 2024-10-14

## 2024-10-14 DIAGNOSIS — O24.410 DIET CONTROLLED GESTATIONAL DIABETES MELLITUS (GDM) IN THIRD TRIMESTER: Primary | ICD-10-CM

## 2024-10-14 NOTE — RESULT ENCOUNTER NOTE
3 hour GTT with two elevated values.  Consistent with gestational diabetes.  Referral to Diabetes in Pregnancy Program.  Please inform patient and have her call them to schedule teaching.

## 2024-10-14 NOTE — TELEPHONE ENCOUNTER
Patient called to schedule GDM classes with MFM, referral is entered incorrectly (Sent to [facility]), please generate correct referral so it can be attached to her visits. Thank you!   -3

## 2024-10-15 ENCOUNTER — DOCUMENTATION (OUTPATIENT)
Dept: PERINATAL CARE | Facility: CLINIC | Age: 19
End: 2024-10-15

## 2024-10-15 ENCOUNTER — PATIENT MESSAGE (OUTPATIENT)
Dept: PERINATAL CARE | Facility: CLINIC | Age: 19
End: 2024-10-15

## 2024-10-15 ENCOUNTER — TELEMEDICINE (OUTPATIENT)
Dept: PERINATAL CARE | Facility: CLINIC | Age: 19
End: 2024-10-15
Payer: COMMERCIAL

## 2024-10-15 DIAGNOSIS — O24.410 DIET CONTROLLED GESTATIONAL DIABETES MELLITUS (GDM) IN THIRD TRIMESTER: Primary | ICD-10-CM

## 2024-10-15 DIAGNOSIS — E66.3 OVERWEIGHT WITH BODY MASS INDEX (BMI) OF 28 TO 28.9 IN ADULT: ICD-10-CM

## 2024-10-15 DIAGNOSIS — Z3A.28 28 WEEKS GESTATION OF PREGNANCY: ICD-10-CM

## 2024-10-15 PROCEDURE — G0108 DIAB MANAGE TRN  PER INDIV: HCPCS | Performed by: DIETITIAN, REGISTERED

## 2024-10-15 RX ORDER — LANCETS 33 GAUGE
EACH MISCELLANEOUS
Qty: 100 EACH | Refills: 5 | Status: SHIPPED | OUTPATIENT
Start: 2024-10-15 | End: 2025-01-06

## 2024-10-15 RX ORDER — BLOOD SUGAR DIAGNOSTIC
STRIP MISCELLANEOUS
Qty: 100 STRIP | Refills: 5 | Status: SHIPPED | OUTPATIENT
Start: 2024-10-15 | End: 2025-01-06

## 2024-10-15 RX ORDER — BLOOD-GLUCOSE METER
EACH MISCELLANEOUS
Qty: 1 KIT | Refills: 0 | Status: SHIPPED | OUTPATIENT
Start: 2024-10-15 | End: 2025-01-06

## 2024-10-15 NOTE — PROGRESS NOTES
Demographics:  Language: English  Ethnicity: White/   Country of Origin: [place]  Highest grade completed: High School Diploma  Occupation: Unemployed    Personal & Family History:  Personal history of diabetes? no  Family members with diabetes:  none  How many total pregnancies have you had? 1  How many children do you have? 0  Are you having swelling or fluid retention? no  Have you been placed on bedrest? no    Nutrition & Physical Activity:  Do you exercise? yes  Type of Exercise: Walking for 20 minutes daily  Frequency of Exercise: Daily  How many meals do you eat daily? 3  List times of meals: Breakfast: 10 AM/ Lunch: 12:30 PM/ Dinner: 5-7 PM  How many snacks do you eat daily? 1  List times of snacks: Other 8:30 PM  What type of diet are you following at home? Regular  Do you have special Pentecostal or ethnic dietary preferences? yes recently changed eating habits. Now eating more chicken, vegetables & saladsw  Do you have any food allergies or intolerances? no  Do you receive WIC or food stamps? yes WIC foods    Learning preferences:  How do you learn best? Reading  How do you rate your health? Fair  Who is your primary support person? Mon & SO  How do you cope with stress? Handles stress welt; does breathing & trying to work through it  How do you feel about being pregnant with diabetes? Upset at first & has already started to change her eating habits. Has connected with a Facebook Support group

## 2024-10-15 NOTE — PROGRESS NOTES
"    Thank you for referring your patient to [facility] Maternal Fetal Medicine Diabetes in Pregnancy Program.     Julia Haines is a  19 y.o. female who presents today for Individual  Class 1.  Patient is at 28w1d gestation, Estimated Date of Delivery: 25.     Reviewed and updated the following from patients medical record: PMH, Problem List, Allergies, and Current Medications.    Visit Diagnosis:  Diet controlled GDM    Discussed with patient pathophysiology of GDM, untreated hyperglycemia in pregnancy and maternal fetal complications including fetal macrosomia,  hypoglycemia, polyhydramnios, increased incidence of  section,  labor, and in severe cases fetal demise and still birth . Discussed importance of blood glucose monitoring, nutrition, and medication if necessary in achieving BG goals.     Additional Pregnancy Complications:  Overweight prior to pregnancy    Labs:    Lab Results   Component Value Date    YHT6SIZG74KC 157 (H) 10/04/2024       Lab Results   Component Value Date    GLUF 83 10/11/2024    ACFHVFT7DB 159 10/11/2024    ZQZEWUW1OY 160 (H) 10/11/2024    ZOZRJBC0EE 141 (H) 10/11/2024        No components found for: \"HGA1C\"    Medications:  No diabetes related medications    Anthropometrics:  Ht Readings from Last 3 Encounters:   24 5' 6\" (1.676 m)   24 5' 6\" (1.676 m)   24 5' 6\" (1.676 m)     Wt Readings from Last 3 Encounters:   24 82.5 kg (181 lb 12.8 oz)   24 79.8 kg (176 lb)   24 79.5 kg (175 lb 3.2 oz)     Pre-gravid weight: 78.9 kg (174 lb)  Pre-gravid BMI: 28.10  Weight Change: 3.538 kg (7 lb 12.8 oz)  Weight gain recommendations: BMI (25-29.9) 15-25 lbs  Comments: may gain 13more pounds for the remainder of the pregnancy    Recent Ultra Sound Results:  Date: 24  Fetal Growth: Normal  VERONIQUE: Normal  Next US date: to be scheduled    Blood Glucose Monitoring:   Glucose Meter: OneTouch Verio Flex  Instructed on testing blood " sugars: 4 x per day (Fasting, 2 hour after start of each meal)    Gave instruction on site selection, skin preparation, loading strips and lancet device, meter activation, obtaining blood sample, test strip and lancet disposal and storage, and recording log book entries. Patient has good understanding of material covered and was able to test their own blood sugar in office today.     Instruction for reporting blood sugar results weekly via:  Phone: [phone] OR  My Chart (Message with image attachment, or Glucose Flowsheet)    Goal Blood Sugar Ranges:   Fastin-95 mg/dL  1 hour after the start of each meal: 140 mg/dL or less  2 hours after start of each meal: 120 mg/dL or less    Meal Plan (daily calorie and protein needs):  Calories: 2300 calorie    Type of Diet:Regular  Additional Nutrition Concerns: Stated she has already started to change eating habits as she is now on a Facebook page for women with gestational diabetes. Is eating more vegetables & salads & states she feels better. Receives [employer] foods.     Meal Plan Tips:  1. Patient was provided with a meal plan including 3 meals and 3 snacks.  2. Discussed appropriate amounts of CHO, PRO, and Fat at each meal and snack.   3. Reviewed CHO exchange list, and portion sizes for both CHO and PRO via food models  4. Instruction on how to read a food label  5. Provided suggested meal/snack options to increase nutrition and maintain consistent meal and snack intakes.  6. Instructed on how to keep a 3-day food diary to be brought to follow- up appointment.   7. Encouraged  patient to eat every 2.0-3.5 hours while awake  8. Encouraged patient to go no longer than 8-10 hours fasting overnight until first meal of the day.      Physical Activity:  Discussed benefits of physical activity to optimize blood glucose control, encouraged activity at patient is physically able. Always consult a physician prior to starting an exercise program. Recommend 20-30 minutes  "daily.    Patient Stated Goal: \"I will plan my meals and snacks every day\"    Diabetes Self Management Support Plan outside of ongoing care: Spouse/Family    Learner/s Present:Learners Present: Patient   Barriers to Learning/Change: Financial  Expected Compliance: good    Date to report blood sugars: Monday, 10/21/24  Class 2 (date): Tuesday, 10/21/24    Begin Time: 2:30 PM  End Time: 3:30 PM    It was a pleasure working with them today. Please feel free to call with any questions or concerns.    Jaymie Cotton, MS, RD, [facility]  Diabetes Educator  [facility] Maternal Fetal Medicine  Diabetes in Pregnancy Program  [address], [address]  [place] [address]       Virtual Regular Visit  Name: Julia Haines      : 2005      MRN: [id number]  Encounter Provider: Jaymie Cotton  Encounter Date: 10/15/2024   Encounter department: [facility]    Verification of patient location: [place]    Patient is located at Home in the following state in which I hold an active license [place]    Assessment & Plan  28 weeks gestation of pregnancy           Diet controlled gestational diabetes mellitus (GDM) in third trimester    No results found for: \"HGBA1C\"         Overweight with body mass index (BMI) of 28 to 28.9 in adult               Encounter provider Jaymie Cotton    The patient was identified by name and date of birth. Julia Haines was informed that this is a telemedicine visit and that the visit is being conducted through the Epic Embedded platform. She agrees to proceed..  My office door was closed. No one else was in the room.  She acknowledged consent and understanding of privacy and security of the video platform. The patient has agreed to participate and understands they can discontinue the visit at any time.    Patient is aware this is a billable service.     History of Present Illness     Julia Haines is a 19 y.o. female who presents with " pregnancy      Review of Systems        Objective     LMP 03/21/2024 (Exact Date)   Physical Exam--not perfomred    Visit Time  Total Visit Duration: 60 minutes

## 2024-10-16 ENCOUNTER — TELEPHONE (OUTPATIENT)
Age: 19
End: 2024-10-16

## 2024-10-16 ENCOUNTER — TELEPHONE (OUTPATIENT)
Dept: PERINATAL CARE | Facility: CLINIC | Age: 19
End: 2024-10-16

## 2024-10-16 ENCOUNTER — ROUTINE PRENATAL (OUTPATIENT)
Dept: OBGYN CLINIC | Facility: CLINIC | Age: 19
End: 2024-10-16

## 2024-10-16 VITALS — HEIGHT: 66 IN | WEIGHT: 184.2 LBS | OXYGEN SATURATION: 98 % | BODY MASS INDEX: 29.6 KG/M2 | HEART RATE: 98 BPM

## 2024-10-16 DIAGNOSIS — Z3A.28 28 WEEKS GESTATION OF PREGNANCY: ICD-10-CM

## 2024-10-16 DIAGNOSIS — O09.93 ENCOUNTER FOR SUPERVISION OF HIGH RISK PREGNANCY IN THIRD TRIMESTER, ANTEPARTUM: Primary | ICD-10-CM

## 2024-10-16 DIAGNOSIS — O24.410 DIET CONTROLLED GESTATIONAL DIABETES MELLITUS (GDM) IN THIRD TRIMESTER: ICD-10-CM

## 2024-10-16 DIAGNOSIS — O09.899 RUBELLA NON-IMMUNE STATUS, ANTEPARTUM: ICD-10-CM

## 2024-10-16 DIAGNOSIS — Z28.39 RUBELLA NON-IMMUNE STATUS, ANTEPARTUM: ICD-10-CM

## 2024-10-16 PROBLEM — Z34.03 ENCOUNTER FOR SUPERVISION OF NORMAL FIRST PREGNANCY IN THIRD TRIMESTER: Status: ACTIVE | Noted: 2024-08-23

## 2024-10-16 PROCEDURE — PNV: Performed by: OBSTETRICS & GYNECOLOGY

## 2024-10-16 NOTE — TELEPHONE ENCOUNTER
Jaymie Cotton  P  [place] [employer]  Hi All,  This lady is newly diagnosed with GDM. Please call her to schedule another ultrasound.  Thanks!  Leydi SIERRA regarding her appt 10/17/24 in the [place] office at 9;15AM.If patient needs to reschedule please call [phone].Thank you

## 2024-10-16 NOTE — TELEPHONE ENCOUNTER
Patient was recently diagnosed with GDM. A growth scan for 30 mins is recommended as soon as possible.

## 2024-10-16 NOTE — PROGRESS NOTES
"Assessment & Plan  19 y.o.  at 28w2d presenting for routine prenatal visit.     Problem List       Rubella non-immune status, antepartum    Overview     Re-vaccinate post partum         History of depression    Overview     Unmedicated, doing well.         28 weeks gestation of pregnancy    Overview     Prenatal labs normal.  Rubella non-immune  Normal nuchal translucency  NIPT normal; boy  20w anatomy normal  Flu [ ] - would like at later visit  [ ] TDAP/ [ ] consent  [ ] GBS  Contraception: pills         Diet controlled gestational diabetes mellitus (GDM) in third trimester    Overview     F/u growth ultrasound          Other Visit Diagnoses       Encounter for supervision of high risk pregnancy in third trimester, antepartum    -  Primary          ____________________________________________________________  Subjective  She is without complaint.   She denies contractions, loss of fluid, or vaginal bleeding.   She feels regular fetal movements, but has been less than previously; reviewed fetal kick counts, and to call if she has less than 10 kicks in 2 hours    Objective  Pulse 98   Ht 5' 6\" (1.676 m)   Wt 83.6 kg (184 lb 3.2 oz)   LMP 2024 (Exact Date)   SpO2 98%   BMI 29.73 kg/m² ; /60  Fundal height 25 cm  FHR: 150 bpm    Physical Exam  Constitutional:       Appearance: Normal appearance.   HENT:      Head: Normocephalic and atraumatic.   Cardiovascular:      Rate and Rhythm: Normal rate.   Pulmonary:      Effort: Pulmonary effort is normal. No respiratory distress.   Abdominal:      Palpations: Abdomen is soft.      Tenderness: There is no abdominal tenderness.   Musculoskeletal:         General: Normal range of motion.   Neurological:      Mental Status: She is alert.   Skin:     General: Skin is warm and dry.          Patient's Active Problem List  Patient Active Problem List   Diagnosis    Rubella non-immune status, antepartum    History of depression    28 weeks gestation of " pregnancy    Diet controlled gestational diabetes mellitus (GDM) in third trimester           Norma Caballero MD  10/16/2024  8:50 AM

## 2024-10-17 ENCOUNTER — ULTRASOUND (OUTPATIENT)
Dept: PERINATAL CARE | Facility: OTHER | Age: 19
End: 2024-10-17
Payer: COMMERCIAL

## 2024-10-17 VITALS
BODY MASS INDEX: 29.28 KG/M2 | WEIGHT: 182.2 LBS | DIASTOLIC BLOOD PRESSURE: 80 MMHG | HEIGHT: 66 IN | HEART RATE: 96 BPM | SYSTOLIC BLOOD PRESSURE: 110 MMHG

## 2024-10-17 DIAGNOSIS — O24.410 DIET CONTROLLED GESTATIONAL DIABETES MELLITUS (GDM) IN THIRD TRIMESTER: ICD-10-CM

## 2024-10-17 DIAGNOSIS — Z3A.28 28 WEEKS GESTATION OF PREGNANCY: Primary | ICD-10-CM

## 2024-10-17 PROCEDURE — 99213 OFFICE O/P EST LOW 20 MIN: CPT | Performed by: OBSTETRICS & GYNECOLOGY

## 2024-10-17 PROCEDURE — 76816 OB US FOLLOW-UP PER FETUS: CPT | Performed by: OBSTETRICS & GYNECOLOGY

## 2024-10-22 ENCOUNTER — TELEMEDICINE (OUTPATIENT)
Dept: PERINATAL CARE | Facility: CLINIC | Age: 19
End: 2024-10-22
Payer: COMMERCIAL

## 2024-10-22 DIAGNOSIS — Z3A.29 29 WEEKS GESTATION OF PREGNANCY: ICD-10-CM

## 2024-10-22 DIAGNOSIS — O24.410 DIET CONTROLLED GESTATIONAL DIABETES MELLITUS (GDM) IN THIRD TRIMESTER: Primary | ICD-10-CM

## 2024-10-22 PROCEDURE — G0109 DIAB MANAGE TRN IND/GROUP: HCPCS

## 2024-10-22 NOTE — PROGRESS NOTES
"CLASS 2 - Group  (virtual visit)    Thank you for referring your patient to [facility] Maternal Fetal Medicine Diabetes and Pregnancy Program.     Julia Haines is a  19 y.o. female who presents today unaccompanied for Virtual Regular Visit, Patient Education, and Gestational Diabetes.  Patient is at 29w1d gestation, Estimated Date of Delivery: 1/6/25.     Visit Diagnosis:  Encounter Diagnosis     ICD-10-CM    1. Diet controlled gestational diabetes mellitus (GDM) in third trimester  O24.410 Ambulatory referral to Diabetic Education      2. 29 weeks gestation of pregnancy  Z3A.29          Reviewed and updated the following from patients medical record: PMH, Problem List, Allergies, and Current Medications.    Labs  GDM LABS: See Class 1 Note    A1C:  No results found for: \"HGBA1C\"     Labs Ordered This Visit: None    Current Medications:    Current Outpatient Medications:     amoxicillin (AMOXIL) 500 MG tablet, Take 500 mg by mouth (Patient not taking: Reported on 10/16/2024), Disp: , Rfl:     Blood Glucose Monitoring Suppl (OneTouch Verio Flex System) w/Device KIT, Test 4 times daily, Disp: 1 kit, Rfl: 0    chlorhexidine (PERIDEX) 0.12 % solution, Apply to the mouth or throat 0.12% (Patient not taking: Reported on 10/16/2024), Disp: , Rfl:     Lancets (OneTouch Delica Plus [id number]) MISC, Test 4 times ddily, Disp: 100 each, Rfl: 5    OneTouch Verio test strip, Test 4 times daily, Disp: 100 strip, Rfl: 5    Prenatal Multivit-Min-Fe-FA (PRENATAL 1 + IRON PO), Take by mouth, Disp: , Rfl:      Anthropometrics:  Ht Readings from Last 1 Encounters:   10/17/24 5' 6\" (1.676 m)      Wt Readings from Last 3 Encounters:   10/17/24 82.6 kg (182 lb 3.2 oz)   10/16/24 83.6 kg (184 lb 3.2 oz)   09/18/24 82.5 kg (181 lb 12.8 oz)        Pre-Gravid Wt Pre-Gravid BMI TWG   78.9 kg (174 lb) 28.10 3.719 kg (8 lb 3.2 oz)     Total Pregnancy Weight Gain Recommendations: BMI (25-29.9) 15-25 lbs  Current Wt Status Compared to " Recommendations: Within Range -- Continue recommended rate of weight gain based on pre-pregnancy BMI till delivery    Most Recent Ultrasound Results:  Findings: NML Growth/VERONIQUE  Next US date: Scheduled Appropriately    BLOOD GLUCOSE MONITORING:   Glucometer: OneTouch Verio Flex     Reinforced at Today's Visit:   Timing/Frequency of SMB x per day (Fasting, 2 hour after start of each meal)  Goals: (Fasting) 60-95mg/dL // (2hr PP) <120mg/dL  Reporting Guidelines: Weekly via Phone: [phone] OR My Chart (Message with image attachment) OR Glucose Flowsheet  Method of Reporting: [employer] Glucose Flowsheet    BG LOG:         Review of Blood Glucose Log:   Indicates excellent glycemic control  FBG = Well controlled  Post-Prandial BG = Well controlled    MEAL PLAN (Patient was provided with a meal plan including 3 meals and 3 snacks at class 1)  *Calories: 2200 calorie (CHO:82-84-64-30-60-30) (PRO: 3-2-3/4-2-3/4-2)    Reinforced Diet Instructions:  Individualized meal plan.   Importance of consistent carbohydrate intake via 3 meals and 3 snacks per day   Importance of protein as it relates to blood glucose control.   Encouraged  patient to eat every 2.0-3.5 hours while awake  Encouraged patient to go no longer than 8-10 hours fasting overnight until first meal of the day.  Provided suggested meal/snack options to increase nutrition and maintain consistent meal and snack intakes.    Physical Activity:    Reviewed w/ Pt:   Benefits of physical activity to optimize blood glucose control, encouraged activity at patient is physically able.   Instructed pt to always consult a physician prior to starting an exercise program.   Recommend 20-30 minutes daily.    Additional Topics Reviewed:    Medications: (reviewed options available with pt)  Discussed if blood sugars are not within normal range with meal planning and exercise  Reviewed medication such as metformin and/or basal/bolus insulin may be needed for better glucose  "control  Maternal-Fetal Testing:   Ultrasounds: growth scans every 4 weeks.  NST: twice weekly starting at 32nd week GA  VERONIQUE:  weekly starting at 32 weeks GA  Sick day Guidelines:   Advised that sickness will raise blood sugar   If blood sugar is > 160 mg/dL twice in one day call doctor  If on diabetes medications, continue as instructed   If unable to consume normal meal plan, instructed to remain well hydrated   Hypoglycemia & Treatment Guidelines:  Reviewed what hypoglycemia is, signs and symptoms, and how to treat via the 15:15 rule.  Post-Partum Guidelines:  Completion of 75 gm CHO 2 hr gtt at 6 weeks post-partum to check for Type 2 DM diagnosis  Breastfeeding Guidelines:  Continue GDM meal plan plus additional 350-500 calories daily  Examples of protein and carbohydrate snacks provided.  Stay hydrated by drinking 8-10 (8 oz.) fluids daily.  Dining Out & Travel Guidelines:  Patient advised to be prepared with extra diabetes supplies, medications, and snacks, as well as sticking to the same time schedule and portions eaten at home for meals and snacks.    Patient Stated Goal: \"I will plan my meals and snacks every day\"  Goal Assessment: On track    Diabetes Self Management Support Plan outside of ongoing care: Spouse/Family    Barriers to Learning/Change: No Barriers  Expected Compliance: good    Date to report blood sugars: Weekly   Follow up:  Return for per review of weekly blood sugar log.     Begin Time: 1:00pm  End Time: 2:00pm    It was a pleasure working with them today. Please feel free to call [phone]) with any questions or concerns.    Carmelita Fitzgerald RD   Diabetes Educator  [facility] Maternal Fetal Medicine  Diabetes and Pregnancy Program  [address] [place] [address], [address]  [place] [address]    Virtual Regular Visit  Name: Julia Haines      : 2005      MRN: [id number]  Encounter Provider: Carmelita Fitzgerald RD  Encounter Date: 10/22/2024   Encounter department: [facility]  " "[facility]    Verification of patient location:    Patient is located at Home in the following state in which I hold an active license [place]    Assessment & Plan  Diet controlled gestational diabetes mellitus (GDM) in third trimester    No results found for: \"HGBA1C\"    Orders:    Ambulatory referral to Diabetic Education    29 weeks gestation of pregnancy             Encounter provider Carmelita Fitzgerald RD    The patient was identified by name and date of birth. Julia Haines was informed that this is a telemedicine visit and that the visit is being conducted through the [employer] platform. She agrees to proceed..  My office door was closed. No one else was in the room.  She acknowledged consent and understanding of privacy and security of the video platform. The patient has agreed to participate and understands they can discontinue the visit at any time.    Patient is aware this is a billable service.     History of Present Illness     Julia Haines is a 19 y.o. female who presents pregnant.      Review of Systems        Objective     LMP 03/21/2024 (Exact Date)   Physical Exam    Visit Time  Total Visit Duration: 60min        "

## 2024-10-25 ENCOUNTER — HOSPITAL ENCOUNTER (OUTPATIENT)
Facility: HOSPITAL | Age: 19
Discharge: HOME | End: 2024-10-25
Attending: OBSTETRICS & GYNECOLOGY | Admitting: OBSTETRICS & GYNECOLOGY
Payer: COMMERCIAL

## 2024-10-25 ENCOUNTER — NURSE TRIAGE (OUTPATIENT)
Age: 19
End: 2024-10-25

## 2024-10-25 VITALS
SYSTOLIC BLOOD PRESSURE: 118 MMHG | HEART RATE: 78 BPM | RESPIRATION RATE: 18 BRPM | TEMPERATURE: 98.6 F | DIASTOLIC BLOOD PRESSURE: 76 MMHG

## 2024-10-25 PROBLEM — O36.8190 DECREASED FETAL MOVEMENT: Status: ACTIVE | Noted: 2024-10-25

## 2024-10-25 PROCEDURE — 76815 OB US LIMITED FETUS(S): CPT

## 2024-10-25 PROCEDURE — 99202 OFFICE O/P NEW SF 15 MIN: CPT

## 2024-10-25 PROCEDURE — NC001 PR NO CHARGE: Performed by: OBSTETRICS & GYNECOLOGY

## 2024-10-25 NOTE — PROGRESS NOTES
L&D Triage Note - OB/GYN  Julia Haines 19 y.o. female MRN: [id number]  Unit/Bed#:  TRIAGE 3-01 Encounter: [id number]      ASSESSMENT:    Julia Haines is a 19 y.o.  at 29w4d who was evaluated today for decreased fetal movement. NST is reactive and VERONIQUE within normal limits with fetal movement seen on ultrasound. Patient feeling fetal movement in triage. Safe for discharge to home with return precautions reviewed.    PLAN:    1) Decreased Fetal Movement  - VERONIQUE: 14.68 cm  - NST reactive   - Patient feeling movement in triage  - Fetal movement visualized on ultrasound      2) 29 weeks gestation of pregnancy  - Continue routine prenatal care  - Discharge from OB triage with  labor precautions    - Reviewed rupture of membranes, false vs true labor, decreased fetal movement, and vaginal bleeding   - Pt to call provider with any concerns and follow up at her next scheduled prenatal appointment on 10/30   - Case discussed with Dr. Sharp    SUBJECTIVE:    Julia Haines 19 y.o.  at 29w4d with an Estimated Date of Delivery: 25 presenting with decreased fetal movement over the last 2 days.  Patient reports slightly decreased kick counts from normal.  Patient feeling fetal movement in triage.    Reports nausea and vomiting occurring in the morning, reports slightly decreased p.o. intake in the morning, however is able to tolerate p.o. intake here in L&D triage.    Her past obstetrical history is significant for none  Her current pregnancy has been complicated by A1 GDM, rubella nonimmune, nausea, vomiting.     Contractions: Denies  Leakage of fluid: Denies  Vaginal Bleeding: Denies  Fetal movement: present    OBJECTIVE:    Vitals:    10/25/24 1756   BP: 118/76   Pulse: 78   Resp: 18   Temp: 98.6 °F (37 °C)       ROS:  Constitutional: Negative  Respiratory: Negative  Cardiovascular: Negative   Gastrointestinal: Negative    General Physical Exam:  General: Well appearing, no  distress  Respiratory: Unlabored breathing  Cardiovascular: Regular rate.  Abdomen: Soft, gravid, nontender  Fundal Height: Appropriate for gestational age.  Extremities: Warm and well perfused.  Non tender.    Fetal monitoring:  Fetal heart rate: Baseline Rate (FHR): 135 bpm  Variability: Moderate  Accelerations: 15 x 15 or greater  Decelerations: None  [place]: Contraction Frequency (minutes): 0  Contraction Duration (seconds): 0    Imaging:        Abd. US   VERONIQUE      - Q1 6.24 cm     - Q2 2.88 cm     - Q3 5.56 cm     - Total: 14.68 cm   Presentation: Vertex    Luis Chandra DO  10/25/2024  8:36 PM

## 2024-10-25 NOTE — TELEPHONE ENCOUNTER
"Patient calling in stating that she's 29w4d , pt has been experiencing decreased fetal movement for 3 days now. Pt stating that she hasn't felt the baby move at all today, pt stating that she \"felt the baby have hiccups yesterday morning, but after that I felt nothing\". Pt reporting having a high glucose number after lunch of 130. Pt reporting vomiting, pt reporting that she can't keep down breakfast or lunch. Pt stating that this started a week ago.   Pt denies abdominal pain, leaking fluid from vagina, vaginal bleeding      Patient is advised to go to [facility] L&D for further evaluation and to leave now. Pt verbalized understanding and stated that it will take her 20 minutes to arrive and that she will be leaving now.       Epic Secure Chat sent to Dr Sharp- on call   Epic Secure Chat sent to Charge L&D RN: Symone Celestin RN               Reason for Disposition   Pregnant 23 or more weeks with no movement of baby > 8 hours    Answer Assessment - Initial Assessment Questions  1. FETAL MOVEMENT: \"Has the baby's movement decreased or changed significantly from normal?\" (e.g., yes, no; describe)      pt has been experiencing decreased fetal movement for 3 days now. Pt stating that she hasn't felt the baby move at all today, pt stating that she \"felt the baby have hiccups yesterday morning, but after that I felt nothing\".   2. MARIN: \"What date are you expecting to deliver?\"       25  3. PREGNANCY: \"How many weeks pregnant are you?\"       29w4d  4. OTHER SYMPTOMS: \"Do you have any other symptoms?\" (e.g., abdominal pain, leaking fluid from vagina, vaginal bleeding, etc.)    Pt reporting vomiting, pt reporting that she can't keep down breakfast or lunch. Pt stating that this started a week ago.   Pt denies abdominal pain, leaking fluid from vagina, vaginal bleeding    Protocols used: Pregnancy - Decreased Fetal Movement-ADULT-OH    "

## 2024-10-26 NOTE — DISCHARGE INSTR - AVS FIRST PAGE
You were seen and evaluated for decreased fetal movement.  You had a reactive stress test, and your baby movement was visualized on ultrasound.    Please follow-up with your primary care provider for further management of your pregnancy.  Please return to the emergency department if you develop persistent decreased fetal movement, persistent vomiting, persistent fever, persistent abdominal pain, regular painful contractions.

## 2024-10-26 NOTE — PROCEDURES
Julia Haines, a  at 29w4d with an MARIN of 2025, by Ultrasound, was seen at [facility] LABOR AND DELIVERY for the following procedure(s): $Procedure Type: VERONIQUE]         4 Quadrant VERONIQUE  VERONIQUE Q1 (cm): 5.6 cm  VERONIQUE Q2 (cm): 2.9 cm  VERONIQUE Q3 (cm): 0 cm  VERONIQUE Q4 (cm): 6.2 cm  VERONIQUE TOTAL (cm): 14.7 cm                         Estela Arias DO   Obstetrics & Gynecology PGY-II  10/25/24  8:34 PM

## 2024-10-30 ENCOUNTER — ROUTINE PRENATAL (OUTPATIENT)
Dept: OBGYN CLINIC | Facility: CLINIC | Age: 19
End: 2024-10-30

## 2024-10-30 VITALS
HEIGHT: 66 IN | SYSTOLIC BLOOD PRESSURE: 116 MMHG | BODY MASS INDEX: 29.47 KG/M2 | DIASTOLIC BLOOD PRESSURE: 72 MMHG | WEIGHT: 183.4 LBS

## 2024-10-30 DIAGNOSIS — Z28.39 RUBELLA NON-IMMUNE STATUS, ANTEPARTUM: ICD-10-CM

## 2024-10-30 DIAGNOSIS — O24.410 DIET CONTROLLED GESTATIONAL DIABETES MELLITUS (GDM) IN THIRD TRIMESTER: ICD-10-CM

## 2024-10-30 DIAGNOSIS — O09.93 ENCOUNTER FOR SUPERVISION OF HIGH RISK PREGNANCY IN THIRD TRIMESTER, ANTEPARTUM: Primary | ICD-10-CM

## 2024-10-30 DIAGNOSIS — Z3A.30 30 WEEKS GESTATION OF PREGNANCY: ICD-10-CM

## 2024-10-30 DIAGNOSIS — O09.899 RUBELLA NON-IMMUNE STATUS, ANTEPARTUM: ICD-10-CM

## 2024-10-30 PROCEDURE — PNV: Performed by: OBSTETRICS & GYNECOLOGY

## 2024-10-30 NOTE — PROGRESS NOTES
"Assessment & Plan  19 y.o.  at 30w2d presenting for routine prenatal visit.       Problem List       Rubella non-immune status, antepartum    Overview     Re-vaccinate post partum         History of depression    Overview     Unmedicated, doing well.         30 weeks gestation of pregnancy    Overview     Prenatal labs normal.  Rubella non-immune  Normal nuchal translucency  NIPT normal; boy  20w anatomy normal  Flu [ ] - would like at later visit  [ ] TDAP/ [ ] consent  [ ] GBS  Contraception: pills         Diet controlled gestational diabetes mellitus (GDM) in third trimester    Overview     F/u growth ultrasound         Decreased fetal movement     Other Visit Diagnoses       Encounter for supervision of high risk pregnancy in third trimester, antepartum    -  Primary          ____________________________________________________________  Subjective  She is without complaint.   She denies contractions, loss of fluid, or vaginal bleeding.   She feels regular fetal movements.     Objective  /72 (BP Location: Right arm, Patient Position: Sitting, Cuff Size: Standard)   Ht 5' 6\" (1.676 m)   Wt 83.2 kg (183 lb 6.4 oz)   LMP 2024 (Exact Date)   BMI 29.60 kg/m²   FHR: 140 bpm  Fundal height 29 cm    Physical Exam  Constitutional:       Appearance: She is well-developed.   Cardiovascular:      Rate and Rhythm: Normal rate.   Pulmonary:      Effort: Pulmonary effort is normal. No respiratory distress.   Abdominal:      Palpations: Abdomen is soft.      Tenderness: There is no abdominal tenderness.   Skin:     General: Skin is warm and dry.          Patient's Active Problem List  Patient Active Problem List   Diagnosis    Rubella non-immune status, antepartum    History of depression    30 weeks gestation of pregnancy    Diet controlled gestational diabetes mellitus (GDM) in third trimester    Decreased fetal movement           Norma Caballero MD  10/30/2024  2:15 PM          "
No

## 2024-11-11 ENCOUNTER — APPOINTMENT (OUTPATIENT)
Dept: LAB | Facility: CLINIC | Age: 19
End: 2024-11-11
Payer: COMMERCIAL

## 2024-11-11 DIAGNOSIS — N30.00 ACUTE CYSTITIS WITHOUT HEMATURIA: ICD-10-CM

## 2024-11-11 DIAGNOSIS — N30.00 ACUTE CYSTITIS WITHOUT HEMATURIA: Primary | ICD-10-CM

## 2024-11-11 LAB
AMORPH URATE CRY URNS QL MICRO: ABNORMAL
BACTERIA UR QL AUTO: ABNORMAL /HPF
BILIRUB UR QL STRIP: NEGATIVE
CLARITY UR: ABNORMAL
COLOR UR: ABNORMAL
GLUCOSE UR STRIP-MCNC: NEGATIVE MG/DL
HGB UR QL STRIP.AUTO: NEGATIVE
KETONES UR STRIP-MCNC: NEGATIVE MG/DL
LEUKOCYTE ESTERASE UR QL STRIP: ABNORMAL
NITRITE UR QL STRIP: NEGATIVE
NON-SQ EPI CELLS URNS QL MICRO: ABNORMAL /HPF
PH UR STRIP.AUTO: 7 [PH]
PROT UR STRIP-MCNC: ABNORMAL MG/DL
RBC #/AREA URNS AUTO: ABNORMAL /HPF
SP GR UR STRIP.AUTO: 1.02 (ref 1–1.03)
UROBILINOGEN UR STRIP-ACNC: <2 MG/DL
WBC #/AREA URNS AUTO: ABNORMAL /HPF

## 2024-11-11 PROCEDURE — 81001 URINALYSIS AUTO W/SCOPE: CPT

## 2024-11-11 PROCEDURE — 87086 URINE CULTURE/COLONY COUNT: CPT

## 2024-11-12 ENCOUNTER — TELEPHONE (OUTPATIENT)
Dept: OBGYN CLINIC | Facility: CLINIC | Age: 19
End: 2024-11-12

## 2024-11-12 LAB — BACTERIA UR CULT: NORMAL

## 2024-11-14 ENCOUNTER — ULTRASOUND (OUTPATIENT)
Dept: PERINATAL CARE | Facility: OTHER | Age: 19
End: 2024-11-14
Payer: COMMERCIAL

## 2024-11-14 VITALS
DIASTOLIC BLOOD PRESSURE: 62 MMHG | HEART RATE: 86 BPM | BODY MASS INDEX: 30.31 KG/M2 | SYSTOLIC BLOOD PRESSURE: 110 MMHG | WEIGHT: 188.6 LBS | HEIGHT: 66 IN

## 2024-11-14 DIAGNOSIS — O24.410 DIET CONTROLLED GESTATIONAL DIABETES MELLITUS (GDM) IN THIRD TRIMESTER: ICD-10-CM

## 2024-11-14 DIAGNOSIS — Z3A.32 32 WEEKS GESTATION OF PREGNANCY: Primary | ICD-10-CM

## 2024-11-14 PROCEDURE — 76816 OB US FOLLOW-UP PER FETUS: CPT | Performed by: OBSTETRICS & GYNECOLOGY

## 2024-11-14 PROCEDURE — 99213 OFFICE O/P EST LOW 20 MIN: CPT | Performed by: OBSTETRICS & GYNECOLOGY

## 2024-11-14 NOTE — PROGRESS NOTES
The patient was seen today for an ultrasound.  Please see ultrasound report (located under Ob Procedures) for additional details.   Thank you very much for allowing us to participate in the care of this very nice patient.  Should you have any questions, please do not hesitate to contact me.     Rolando Jo MD FACOG  Attending Physician, Maternal-Fetal Medicine  [facility]

## 2024-11-15 ENCOUNTER — ROUTINE PRENATAL (OUTPATIENT)
Dept: OBGYN CLINIC | Facility: CLINIC | Age: 19
End: 2024-11-15
Payer: COMMERCIAL

## 2024-11-15 VITALS
BODY MASS INDEX: 30.53 KG/M2 | SYSTOLIC BLOOD PRESSURE: 116 MMHG | HEIGHT: 66 IN | DIASTOLIC BLOOD PRESSURE: 74 MMHG | HEART RATE: 100 BPM | OXYGEN SATURATION: 100 % | WEIGHT: 190 LBS

## 2024-11-15 DIAGNOSIS — Z28.39 RUBELLA NON-IMMUNE STATUS, ANTEPARTUM: Primary | ICD-10-CM

## 2024-11-15 DIAGNOSIS — Z23 ENCOUNTER FOR IMMUNIZATION: ICD-10-CM

## 2024-11-15 DIAGNOSIS — Z3A.32 32 WEEKS GESTATION OF PREGNANCY: ICD-10-CM

## 2024-11-15 DIAGNOSIS — O09.899 RUBELLA NON-IMMUNE STATUS, ANTEPARTUM: Primary | ICD-10-CM

## 2024-11-15 PROBLEM — O36.8190 DECREASED FETAL MOVEMENT: Status: RESOLVED | Noted: 2024-10-25 | Resolved: 2024-11-15

## 2024-11-15 PROCEDURE — 90472 IMMUNIZATION ADMIN EACH ADD: CPT | Performed by: OBSTETRICS & GYNECOLOGY

## 2024-11-15 PROCEDURE — 90678 RSV VACC PREF BIVALENT IM: CPT | Performed by: OBSTETRICS & GYNECOLOGY

## 2024-11-15 PROCEDURE — 90471 IMMUNIZATION ADMIN: CPT | Performed by: OBSTETRICS & GYNECOLOGY

## 2024-11-15 PROCEDURE — 90656 IIV3 VACC NO PRSV 0.5 ML IM: CPT | Performed by: OBSTETRICS & GYNECOLOGY

## 2024-11-15 PROCEDURE — 90715 TDAP VACCINE 7 YRS/> IM: CPT | Performed by: OBSTETRICS & GYNECOLOGY

## 2024-11-15 PROCEDURE — PNV: Performed by: OBSTETRICS & GYNECOLOGY

## 2024-11-15 NOTE — PROGRESS NOTES
"Assessment & Plan  19 y.o.  at 32w4d presenting for routine prenatal visit.       Problem List       Rubella non-immune status, antepartum    Overview   Re-vaccinate post partum         History of depression    Overview   Unmedicated, doing well.         32 weeks gestation of pregnancy    Overview   Prenatal labs normal.  Rubella non-immune  Normal nuchal translucency  NIPT normal; boy  20w anatomy normal  Flu [ ] - would like at later visit  [ ] TDAP/ [ ] consent  [ ] GBS  Contraception: pills         Diet controlled gestational diabetes mellitus (GDM) in third trimester    Overview   F/u growth ultrasound          ____________________________________________________________  Subjective  She is without complaint.   She denies contractions, loss of fluid, or vaginal bleeding.   She feels regular fetal movements.       Objective  /74 (BP Location: Right arm, Patient Position: Sitting, Cuff Size: Standard)   Pulse 100   Ht 5' 6\" (1.676 m)   Wt 86.2 kg (190 lb)   LMP 2024 (Exact Date)   SpO2 100%   BMI 30.67 kg/m²   FHR: 140's via doppler    Physical Exam  Constitutional:       Appearance: She is well-developed.   Cardiovascular:      Rate and Rhythm: Normal rate and regular rhythm.      Heart sounds: Normal heart sounds. No murmur heard.     No friction rub. No gallop.   Pulmonary:      Effort: Pulmonary effort is normal. No respiratory distress.      Breath sounds: No wheezing.   Abdominal:      Palpations: Abdomen is soft.      Tenderness: There is no abdominal tenderness.   Musculoskeletal:         General: No tenderness.   Neurological:      Mental Status: She is alert and oriented to person, place, and time.   Vitals reviewed.          Patient's Active Problem List  Patient Active Problem List   Diagnosis    Rubella non-immune status, antepartum    History of depression    32 weeks gestation of pregnancy    Diet controlled gestational diabetes mellitus (GDM) in third trimester "           Tim Sharp MD  11/15/2024  3:06 PM

## 2024-12-02 ENCOUNTER — ROUTINE PRENATAL (OUTPATIENT)
Dept: OBGYN CLINIC | Facility: CLINIC | Age: 19
End: 2024-12-02

## 2024-12-02 VITALS
SYSTOLIC BLOOD PRESSURE: 114 MMHG | DIASTOLIC BLOOD PRESSURE: 72 MMHG | BODY MASS INDEX: 30.41 KG/M2 | HEIGHT: 66 IN | WEIGHT: 189.2 LBS

## 2024-12-02 DIAGNOSIS — Z3A.35 35 WEEKS GESTATION OF PREGNANCY: ICD-10-CM

## 2024-12-02 DIAGNOSIS — Z28.39 RUBELLA NON-IMMUNE STATUS, ANTEPARTUM: ICD-10-CM

## 2024-12-02 DIAGNOSIS — O24.410 DIET CONTROLLED GESTATIONAL DIABETES MELLITUS (GDM) IN THIRD TRIMESTER: ICD-10-CM

## 2024-12-02 DIAGNOSIS — O09.93 ENCOUNTER FOR SUPERVISION OF HIGH RISK PREGNANCY IN THIRD TRIMESTER, ANTEPARTUM: Primary | ICD-10-CM

## 2024-12-02 DIAGNOSIS — O09.899 RUBELLA NON-IMMUNE STATUS, ANTEPARTUM: ICD-10-CM

## 2024-12-02 PROCEDURE — 87150 DNA/RNA AMPLIFIED PROBE: CPT | Performed by: OBSTETRICS & GYNECOLOGY

## 2024-12-02 PROCEDURE — PNV: Performed by: OBSTETRICS & GYNECOLOGY

## 2024-12-02 NOTE — PROGRESS NOTES
"Assessment & Plan  19 y.o.  at 35w0d presenting for routine prenatal visit.     Problem List       Rubella non-immune status, antepartum    Overview   Re-vaccinate post partum         History of depression    Overview   Unmedicated, doing well.         35 weeks gestation of pregnancy    Overview   Prenatal labs normal.  Rubella non-immune  Normal nuchal translucency  NIPT normal; boy  20w anatomy normal  Flu [X]   [X] TDAP/ RSV  [X] consent  GBS collected  Contraception: pills         Diet controlled gestational diabetes mellitus (GDM) in third trimester    Overview   F/u growth ultrasound  24 32w: 65%  Repeat growth at 36w          Other Visit Diagnoses         Encounter for supervision of high risk pregnancy in third trimester, antepartum    -  Primary          ____________________________________________________________  Subjective  She reports pelvic pressure.  She denies contractions, loss of fluid, or vaginal bleeding.   She feels regular fetal movements.     Objective  /72 (BP Location: Right arm, Patient Position: Sitting, Cuff Size: Standard)   Ht 5' 6\" (1.676 m)   Wt 85.8 kg (189 lb 3.2 oz)   LMP 2024 (Exact Date)   BMI 30.54 kg/m²   FHR: 155 bpm  Fundal height 33 cm  SVE closed/thick/high     Physical Exam  Constitutional:       Appearance: She is well-developed.   Genitourinary:      Vulva normal.   Cardiovascular:      Rate and Rhythm: Normal rate.   Pulmonary:      Effort: Pulmonary effort is normal. No respiratory distress.   Abdominal:      Palpations: Abdomen is soft.      Tenderness: There is no abdominal tenderness.   Skin:     General: Skin is warm and dry.          Patient's Active Problem List  Patient Active Problem List   Diagnosis    Rubella non-immune status, antepartum    History of depression    35 weeks gestation of pregnancy    Diet controlled gestational diabetes mellitus (GDM) in third trimester           Norma Caballero MD  2024  10:33 " AM

## 2024-12-04 ENCOUNTER — RESULTS FOLLOW-UP (OUTPATIENT)
Dept: OBGYN CLINIC | Facility: CLINIC | Age: 19
End: 2024-12-04

## 2024-12-04 DIAGNOSIS — Z3A.35 35 WEEKS GESTATION OF PREGNANCY: ICD-10-CM

## 2024-12-04 DIAGNOSIS — O24.410 DIET CONTROLLED GESTATIONAL DIABETES MELLITUS (GDM) IN THIRD TRIMESTER: Primary | ICD-10-CM

## 2024-12-04 LAB — GP B STREP DNA SPEC QL NAA+PROBE: NEGATIVE

## 2024-12-04 RX ORDER — BLOOD SUGAR DIAGNOSTIC
STRIP MISCELLANEOUS
Qty: 100 STRIP | Refills: 3 | Status: SHIPPED | OUTPATIENT
Start: 2024-12-04 | End: 2025-01-06

## 2024-12-04 RX ORDER — LANCETS 33 GAUGE
EACH MISCELLANEOUS
Qty: 100 EACH | Refills: 3 | Status: SHIPPED | OUTPATIENT
Start: 2024-12-04 | End: 2025-01-06

## 2024-12-12 ENCOUNTER — ULTRASOUND (OUTPATIENT)
Dept: PERINATAL CARE | Facility: OTHER | Age: 19
End: 2024-12-12
Payer: COMMERCIAL

## 2024-12-12 VITALS
HEART RATE: 94 BPM | SYSTOLIC BLOOD PRESSURE: 124 MMHG | BODY MASS INDEX: 32.18 KG/M2 | WEIGHT: 199.4 LBS | DIASTOLIC BLOOD PRESSURE: 66 MMHG

## 2024-12-12 DIAGNOSIS — Z3A.36 36 WEEKS GESTATION OF PREGNANCY: ICD-10-CM

## 2024-12-12 DIAGNOSIS — O24.410 DIET CONTROLLED GESTATIONAL DIABETES MELLITUS (GDM) IN THIRD TRIMESTER: Primary | ICD-10-CM

## 2024-12-12 DIAGNOSIS — Z36.89 ENCOUNTER FOR ULTRASOUND TO ASSESS FETAL GROWTH: ICD-10-CM

## 2024-12-12 PROBLEM — F90.0 ATTENTION DEFICIT HYPERACTIVITY DISORDER (ADHD), PREDOMINANTLY INATTENTIVE TYPE: Status: RESOLVED | Noted: 2020-03-05 | Resolved: 2024-12-12

## 2024-12-12 PROCEDURE — 76816 OB US FOLLOW-UP PER FETUS: CPT | Performed by: OBSTETRICS & GYNECOLOGY

## 2024-12-12 PROCEDURE — 99213 OFFICE O/P EST LOW 20 MIN: CPT | Performed by: OBSTETRICS & GYNECOLOGY

## 2024-12-12 NOTE — PROGRESS NOTES
The patient was seen today for an ultrasound.  Please see ultrasound report (located under Ob Procedures) for additional details.   Thank you very much for allowing us to participate in the care of this very nice patient.  Should you have any questions, please do not hesitate to contact me.     Rolando Jo MD FACOG  Attending Physician, Maternal-Fetal Medicine  [facility]

## 2024-12-16 ENCOUNTER — ROUTINE PRENATAL (OUTPATIENT)
Dept: OBGYN CLINIC | Facility: CLINIC | Age: 19
End: 2024-12-16

## 2024-12-16 VITALS
WEIGHT: 204 LBS | OXYGEN SATURATION: 99 % | HEIGHT: 66 IN | SYSTOLIC BLOOD PRESSURE: 122 MMHG | HEART RATE: 92 BPM | DIASTOLIC BLOOD PRESSURE: 80 MMHG | BODY MASS INDEX: 32.78 KG/M2

## 2024-12-16 DIAGNOSIS — O24.410 DIET CONTROLLED GESTATIONAL DIABETES MELLITUS (GDM) IN THIRD TRIMESTER: Primary | ICD-10-CM

## 2024-12-16 DIAGNOSIS — O26.893 VAGINAL DISCHARGE DURING PREGNANCY IN THIRD TRIMESTER: ICD-10-CM

## 2024-12-16 DIAGNOSIS — Z3A.37 37 WEEKS GESTATION OF PREGNANCY: ICD-10-CM

## 2024-12-16 DIAGNOSIS — N89.8 VAGINAL DISCHARGE DURING PREGNANCY IN THIRD TRIMESTER: ICD-10-CM

## 2024-12-16 PROCEDURE — 87591 N.GONORRHOEAE DNA AMP PROB: CPT | Performed by: OBSTETRICS & GYNECOLOGY

## 2024-12-16 PROCEDURE — 87491 CHLMYD TRACH DNA AMP PROBE: CPT | Performed by: OBSTETRICS & GYNECOLOGY

## 2024-12-16 PROCEDURE — 87480 CANDIDA DNA DIR PROBE: CPT | Performed by: OBSTETRICS & GYNECOLOGY

## 2024-12-16 PROCEDURE — 87660 TRICHOMONAS VAGIN DIR PROBE: CPT | Performed by: OBSTETRICS & GYNECOLOGY

## 2024-12-16 PROCEDURE — PNV: Performed by: OBSTETRICS & GYNECOLOGY

## 2024-12-16 PROCEDURE — 87510 GARDNER VAG DNA DIR PROBE: CPT | Performed by: OBSTETRICS & GYNECOLOGY

## 2024-12-16 NOTE — PROGRESS NOTES
"Assessment & Plan  19 y.o.  at 37w0d presenting for routine prenatal visit.       Problem List       Rubella non-immune status, antepartum    Overview   Re-vaccinate post partum         History of depression    Overview   Unmedicated, doing well.         37 weeks gestation of pregnancy    Overview   Prenatal labs normal.  Rubella non-immune  Normal nuchal translucency  NIPT normal; boy  20w anatomy normal  Flu [X]   [X] TDAP/ RSV  [X] consent  GBS negative  Contraception: pills  Induction scheduled on 12/3/24 for A1GDM with Dr. Caballero         Diet controlled gestational diabetes mellitus (GDM) in third trimester    Overview   F/u growth ultrasound  24 32w: 65%  Repeat growth at 36w          ____________________________________________________________  Subjective  She is without complaint.   She denies contractions, loss of fluid, or vaginal bleeding.   She feels regular fetal movements.       Objective  /80 (BP Location: Left arm, Patient Position: Sitting, Cuff Size: Standard)   Pulse 92   Ht 5' 6\" (1.676 m)   Wt 92.5 kg (204 lb)   LMP 2024 (Exact Date)   SpO2 99%   BMI 32.93 kg/m²   FHR: 140's via doppler    Physical Exam  Constitutional:       Appearance: She is well-developed.   Genitourinary:      Vaginal discharge present.   Cardiovascular:      Rate and Rhythm: Normal rate and regular rhythm.      Heart sounds: Normal heart sounds. No murmur heard.     No friction rub. No gallop.   Pulmonary:      Effort: Pulmonary effort is normal. No respiratory distress.      Breath sounds: No wheezing.   Abdominal:      Palpations: Abdomen is soft.      Tenderness: There is no abdominal tenderness.   Musculoskeletal:         General: No tenderness.   Neurological:      Mental Status: She is alert and oriented to person, place, and time.   Vitals reviewed.          Patient's Active Problem List  Patient Active Problem List   Diagnosis    Rubella non-immune status, antepartum    History of " depression    37 weeks gestation of pregnancy    Diet controlled gestational diabetes mellitus (GDM) in third trimester           Tim Sharp MD  12/16/2024  10:44 AM

## 2024-12-17 LAB
C TRACH DNA SPEC QL NAA+PROBE: NEGATIVE
N GONORRHOEA DNA SPEC QL NAA+PROBE: NEGATIVE

## 2024-12-18 ENCOUNTER — RESULTS FOLLOW-UP (OUTPATIENT)
Dept: OBGYN CLINIC | Facility: CLINIC | Age: 19
End: 2024-12-18

## 2024-12-18 LAB
CANDIDA RRNA VAG QL PROBE: NOT DETECTED
G VAGINALIS RRNA GENITAL QL PROBE: NOT DETECTED
T VAGINALIS RRNA GENITAL QL PROBE: NOT DETECTED

## 2024-12-19 ENCOUNTER — HOSPITAL ENCOUNTER (OUTPATIENT)
Facility: HOSPITAL | Age: 19
Discharge: HOME | End: 2024-12-20
Attending: OBSTETRICS & GYNECOLOGY | Admitting: OBSTETRICS & GYNECOLOGY
Payer: COMMERCIAL

## 2024-12-19 ENCOUNTER — NURSE TRIAGE (OUTPATIENT)
Dept: OTHER | Facility: OTHER | Age: 19
End: 2024-12-19

## 2024-12-19 DIAGNOSIS — R11.2 NAUSEA & VOMITING: Primary | ICD-10-CM

## 2024-12-19 PROCEDURE — 85027 COMPLETE CBC AUTOMATED: CPT

## 2024-12-19 PROCEDURE — NC001 PR NO CHARGE: Performed by: OBSTETRICS & GYNECOLOGY

## 2024-12-19 PROCEDURE — 80053 COMPREHEN METABOLIC PANEL: CPT

## 2024-12-19 RX ORDER — ONDANSETRON 2 MG/ML
4 INJECTION INTRAMUSCULAR; INTRAVENOUS ONCE
Status: COMPLETED | OUTPATIENT
Start: 2024-12-19 | End: 2024-12-19

## 2024-12-19 RX ADMIN — SODIUM CHLORIDE 1000 ML: 0.9 INJECTION, SOLUTION INTRAVENOUS at 23:31

## 2024-12-19 RX ADMIN — ONDANSETRON 4 MG: 2 INJECTION INTRAMUSCULAR; INTRAVENOUS at 23:29

## 2024-12-20 VITALS
HEART RATE: 79 BPM | TEMPERATURE: 98.6 F | SYSTOLIC BLOOD PRESSURE: 137 MMHG | OXYGEN SATURATION: 97 % | DIASTOLIC BLOOD PRESSURE: 79 MMHG | RESPIRATION RATE: 18 BRPM

## 2024-12-20 PROBLEM — R11.2 NAUSEA & VOMITING: Status: ACTIVE | Noted: 2024-12-20

## 2024-12-20 LAB
ALBUMIN SERPL BCG-MCNC: 3.2 G/DL (ref 3.5–5)
ALP SERPL-CCNC: 112 U/L (ref 34–104)
ALT SERPL W P-5'-P-CCNC: 7 U/L (ref 7–52)
ANION GAP SERPL CALCULATED.3IONS-SCNC: 7 MMOL/L (ref 4–13)
AST SERPL W P-5'-P-CCNC: 10 U/L (ref 13–39)
BILIRUB SERPL-MCNC: 0.22 MG/DL (ref 0.2–1)
BUN SERPL-MCNC: 12 MG/DL (ref 5–25)
CALCIUM ALBUM COR SERPL-MCNC: 9.2 MG/DL (ref 8.3–10.1)
CALCIUM SERPL-MCNC: 8.6 MG/DL (ref 8.4–10.2)
CHLORIDE SERPL-SCNC: 108 MMOL/L (ref 96–108)
CO2 SERPL-SCNC: 20 MMOL/L (ref 21–32)
CREAT SERPL-MCNC: 0.57 MG/DL (ref 0.6–1.3)
CREAT UR-MCNC: 93.6 MG/DL
ERYTHROCYTE [DISTWIDTH] IN BLOOD BY AUTOMATED COUNT: 13.2 % (ref 11.6–15.1)
GFR SERPL CREATININE-BSD FRML MDRD: 132 ML/MIN/1.73SQ M
GLUCOSE SERPL-MCNC: 87 MG/DL (ref 65–140)
HCT VFR BLD AUTO: 36.4 % (ref 34.8–46.1)
HGB BLD-MCNC: 12.4 G/DL (ref 11.5–15.4)
MCH RBC QN AUTO: 31.2 PG (ref 26.8–34.3)
MCHC RBC AUTO-ENTMCNC: 34.1 G/DL (ref 31.4–37.4)
MCV RBC AUTO: 92 FL (ref 82–98)
PLATELET # BLD AUTO: 188 THOUSANDS/UL (ref 149–390)
PMV BLD AUTO: 14.2 FL (ref 8.9–12.7)
POTASSIUM SERPL-SCNC: 3.9 MMOL/L (ref 3.5–5.3)
PROT SERPL-MCNC: 5.9 G/DL (ref 6.4–8.4)
PROT UR-MCNC: 28.2 MG/DL
PROT/CREAT UR: 0.3 MG/G{CREAT} (ref 0–0.1)
RBC # BLD AUTO: 3.97 MILLION/UL (ref 3.81–5.12)
SODIUM SERPL-SCNC: 135 MMOL/L (ref 135–147)
WBC # BLD AUTO: 14.21 THOUSAND/UL (ref 4.31–10.16)

## 2024-12-20 PROCEDURE — NC001 PR NO CHARGE: Performed by: OBSTETRICS & GYNECOLOGY

## 2024-12-20 PROCEDURE — 99213 OFFICE O/P EST LOW 20 MIN: CPT

## 2024-12-20 PROCEDURE — 82570 ASSAY OF URINE CREATININE: CPT

## 2024-12-20 PROCEDURE — 76815 OB US LIMITED FETUS(S): CPT

## 2024-12-20 PROCEDURE — 84156 ASSAY OF PROTEIN URINE: CPT

## 2024-12-20 PROCEDURE — 59025 FETAL NON-STRESS TEST: CPT

## 2024-12-20 RX ORDER — ONDANSETRON 4 MG/1
4 TABLET, ORALLY DISINTEGRATING ORAL EVERY 6 HOURS PRN
Qty: 30 TABLET | Refills: 0 | Status: SHIPPED | OUTPATIENT
Start: 2024-12-20

## 2024-12-20 NOTE — PROGRESS NOTES
L&D Triage Note - OB/GYN  Julia Haines 19 y.o. female MRN: [id number]  Unit/Bed#:  305-01 Encounter: [id number]    Patient is seen by [facility]    ASSESSMENT  Julia Haines is a 19 y.o.  at 37w3d presenting with contractions q5min, nausea, and vomiting. Nausea improved with zofran and IV fluids. SVE unchanged after 2 hours. Fetal monitoring reactive and reassuring. Pt did have an initial elevated blood pressure which was taken while vomiting. She denies symptoms of preeclampsia. Stable for discharge.     PLAN  #1. Rule out labor:   SVE 2/50/-3, unchanged after 2 hour recheck    #2. Elevated blood pressure without a diagnosis:   First elevated blood pressure in this pregnancy, taken while vomiting  CBC/CMP wnl  UPC: 0.3    Discharge instructions  Patient instructed to call if experiencing worsening contractions, vaginal bleeding, loss of fluid or decreased fetal movement.  Will follow up with Dr. Caballero on 24.    D/w Dr. Sharp  ______________    SUBJECTIVE    MARIN: Estimated Date of Delivery: 25    HPI:  19 y.o.  37w3d presents with complaint of contractions every 5 min for the last hour lasting 60 seconds. She is also nauseous and has started vomiting in the last 30 minutes. No sick contacts. She has no leaking or bleeding. She has felt normal fetal movement all day up until when her contractions started, she is now feeling less movement while martin. She denies headache, vision changes, RUQ pain, LE pain or swelling.     Contractions: present  Leakage of fluid: denies  Vaginal Bleeding: denies  Fetal movement: present    Her obstetrical history is significant for A1GDM    ROS:  Constitutional: Negative  Respiratory: Negative  Cardiovascular: Negative    Gastrointestinal: Nausea and vomiting     OBJECTIVE:    Vitals:  /93 (BP Location: Right arm)   Pulse 79   Temp 98.6 °F (37 °C) (Oral)   Resp 18   LMP 2024 (Exact Date)   SpO2 97%   There is no height or weight on  file to calculate BMI.    Physical Exam  Vitals reviewed. Exam conducted with a chaperone present.   Constitutional:       General: She is not in acute distress.  HENT:      Head: Normocephalic.      Nose: Nose normal.      Mouth/Throat:      Pharynx: Oropharynx is clear.   Eyes:      Conjunctiva/sclera: Conjunctivae normal.   Cardiovascular:      Rate and Rhythm: Normal rate.   Pulmonary:      Effort: Pulmonary effort is normal.   Abdominal:      Palpations: Abdomen is soft.      Tenderness: There is no abdominal tenderness.      Comments: Gravid   Musculoskeletal:         General: No tenderness.      Right lower leg: No edema.      Left lower leg: No edema.   Skin:     General: Skin is warm and dry.      Coloration: Skin is not jaundiced.   Neurological:      Mental Status: She is alert.   Psychiatric:         Mood and Affect: Mood normal.         Behavior: Behavior normal.         SVE:  Presentation: Vertex  Method: Manual  OB Examiner: Cris  2/50/-3    FHT:  Baseline Rate (FHR): 150 bpm  Variability: Moderate  Accelerations: 15 x 15 or greater  Decelerations: None    TOCO:   Contraction Frequency (minutes): 3-4  Contraction Duration (seconds): 60- 90  Contraction Intensity: Mild/Moderate      TAUS   VERONIQUE      - Q1 1.69 cm     - Q2 2.60 cm     - Q3 3.06 cm     - Q4 3.03 cm     - Total: 10.38 cm   Presentation: cephalic            Labs:   Recent Results (from the past 24 hours)   CBC and Platelet    Collection Time: 12/19/24 11:49 PM   Result Value Ref Range    WBC 14.21 (H) 4.31 - 10.16 Thousand/uL    RBC 3.97 3.81 - 5.12 Million/uL    Hemoglobin 12.4 11.5 - 15.4 g/dL    Hematocrit 36.4 34.8 - 46.1 %    MCV 92 82 - 98 fL    MCH 31.2 26.8 - 34.3 pg    MCHC 34.1 31.4 - 37.4 g/dL    RDW 13.2 11.6 - 15.1 %    Platelets 188 149 - 390 Thousands/uL    MPV 14.2 (H) 8.9 - 12.7 fL   Comprehensive metabolic panel    Collection Time: 12/19/24 11:49 PM   Result Value Ref Range    Sodium 135 135 - 147 mmol/L    Potassium  3.9 3.5 - 5.3 mmol/L    Chloride 108 96 - 108 mmol/L    CO2 20 (L) 21 - 32 mmol/L    ANION GAP 7 4 - 13 mmol/L    BUN 12 5 - 25 mg/dL    Creatinine 0.57 (L) 0.60 - 1.30 mg/dL    Glucose 87 65 - 140 mg/dL    Calcium 8.6 8.4 - 10.2 mg/dL    Corrected Calcium 9.2 8.3 - 10.1 mg/dL    AST 10 (L) 13 - 39 U/L    ALT 7 7 - 52 U/L    Alkaline Phosphatase 112 (H) 34 - 104 U/L    Total Protein 5.9 (L) 6.4 - 8.4 g/dL    Albumin 3.2 (L) 3.5 - 5.0 g/dL    Total Bilirubin 0.22 0.20 - 1.00 mg/dL    eGFR 132 ml/min/1.73sq m   Protein / creatinine ratio, urine    Collection Time: 12/20/24  1:16 AM   Result Value Ref Range    Creatinine, Ur 93.6 Reference range not established. mg/dL    Protein Urine Random 28.2 Reference range not established. mg/dL    Prot/Creat Ratio, Ur 0.3 (H) 0.0 - 0.1           Elaine Lynch MD  12/19/2024  11:10 PM

## 2024-12-20 NOTE — TELEPHONE ENCOUNTER
"Reason for Disposition   Baby moving less today (e.g., kick count < 5 in 1 hour or < 10 in 2 hours)    Answer Assessment - Initial Assessment Questions  1. ONSET: \"When did the symptoms begin?\"         One hour ago     2. CONTRACTIONS: \"Describe the contractions that you are having.\" (e.g., duration, frequency, regularity, severity)      5 minutes apart lasting 60 seconds    3. PREGNANCY: \"How many weeks pregnant are you?\"      37 weeks 3 days     4. MARIN: \"What date are you expecting to deliver?\"      1/6/25    5. PARITY: \"Have you had a baby before?\" If Yes, ask: \"How long did the labor last?\"      Denies    6. FETAL MOVEMENT: \"Has the baby's movement decreased or changed significantly from normal?\"      Decreased for the last hour     7. OTHER SYMPTOMS: \"Do you have any other symptoms?\" (e.g., leaking fluid from vagina, vaginal bleeding, fever, hand/facial swelling)      Severe back pain, vomiting and diarrhea    Protocols used: Pregnancy - Labor-Adult-    "

## 2024-12-20 NOTE — PROCEDURES
Julia Haines, a  at 37w4d with an MARIN of 2025, by Ultrasound, was seen at [facility] for the following procedure(s): $Procedure Type: NST, VERONIQUE]    Nonstress Test  Reason for NST: Routine  Variability: Moderate  Decelerations: None  Accelerations: Yes  Acoustic Stimulator: No  Baseline: 150 BPM  Contractions: Regular  Contraction Frequency (minutes): 3 min    4 Quadrant VERONIQUE  VERONIQUE Q1 (cm): 1.7 cm  VERONIQUE Q2 (cm): 2.6 cm  VERONIQUE Q3 (cm): 3.1 cm  VERONIQUE Q4 (cm): 3 cm  VERONIQUE TOTAL (cm): 10.4 cm            Interpretation  Nonstress Test Interpretation: Reactive  Overall Impression: Reassuring      Elaine Lynch MD   PGY-2, OBGYN  2024  6:18 AM

## 2024-12-23 ENCOUNTER — ANESTHESIA EVENT (INPATIENT)
Dept: ANESTHESIOLOGY | Facility: HOSPITAL | Age: 19
End: 2024-12-23
Payer: COMMERCIAL

## 2024-12-23 ENCOUNTER — ANESTHESIA (INPATIENT)
Dept: ANESTHESIOLOGY | Facility: HOSPITAL | Age: 19
End: 2024-12-23
Payer: COMMERCIAL

## 2024-12-23 ENCOUNTER — ROUTINE PRENATAL (OUTPATIENT)
Dept: OBGYN CLINIC | Facility: CLINIC | Age: 19
End: 2024-12-23

## 2024-12-23 ENCOUNTER — HOSPITAL ENCOUNTER (INPATIENT)
Facility: HOSPITAL | Age: 19
LOS: 2 days | Discharge: HOME | End: 2024-12-25
Attending: OBSTETRICS & GYNECOLOGY | Admitting: OBSTETRICS & GYNECOLOGY
Payer: COMMERCIAL

## 2024-12-23 VITALS
SYSTOLIC BLOOD PRESSURE: 142 MMHG | DIASTOLIC BLOOD PRESSURE: 96 MMHG | HEART RATE: 77 BPM | BODY MASS INDEX: 32.95 KG/M2 | WEIGHT: 205 LBS | HEIGHT: 66 IN

## 2024-12-23 DIAGNOSIS — O24.410 DIET CONTROLLED GESTATIONAL DIABETES MELLITUS (GDM) IN THIRD TRIMESTER: ICD-10-CM

## 2024-12-23 DIAGNOSIS — O14.93 PRE-ECLAMPSIA IN THIRD TRIMESTER: ICD-10-CM

## 2024-12-23 DIAGNOSIS — Z3A.38 38 WEEKS GESTATION OF PREGNANCY: ICD-10-CM

## 2024-12-23 DIAGNOSIS — O09.93 ENCOUNTER FOR SUPERVISION OF HIGH RISK PREGNANCY IN THIRD TRIMESTER, ANTEPARTUM: Primary | ICD-10-CM

## 2024-12-23 PROBLEM — Z34.90 ENCOUNTER FOR INDUCTION OF LABOR: Status: ACTIVE | Noted: 2024-12-23

## 2024-12-23 LAB
ABO GROUP BLD: NORMAL
ALBUMIN SERPL BCG-MCNC: 3.2 G/DL (ref 3.5–5)
ALP SERPL-CCNC: 129 U/L (ref 34–104)
ALT SERPL W P-5'-P-CCNC: 9 U/L (ref 7–52)
ANION GAP SERPL CALCULATED.3IONS-SCNC: 7 MMOL/L (ref 4–13)
AST SERPL W P-5'-P-CCNC: 13 U/L (ref 13–39)
BILIRUB SERPL-MCNC: 0.31 MG/DL (ref 0.2–1)
BLD GP AB SCN SERPL QL: NEGATIVE
BUN SERPL-MCNC: 14 MG/DL (ref 5–25)
CALCIUM ALBUM COR SERPL-MCNC: 9 MG/DL (ref 8.3–10.1)
CALCIUM SERPL-MCNC: 8.4 MG/DL (ref 8.4–10.2)
CHLORIDE SERPL-SCNC: 108 MMOL/L (ref 96–108)
CO2 SERPL-SCNC: 20 MMOL/L (ref 21–32)
CREAT SERPL-MCNC: 0.59 MG/DL (ref 0.6–1.3)
ERYTHROCYTE [DISTWIDTH] IN BLOOD BY AUTOMATED COUNT: 13.5 % (ref 11.6–15.1)
GFR SERPL CREATININE-BSD FRML MDRD: 131 ML/MIN/1.73SQ M
GLUCOSE SERPL-MCNC: 102 MG/DL (ref 65–140)
GLUCOSE SERPL-MCNC: 67 MG/DL (ref 65–140)
GLUCOSE SERPL-MCNC: 69 MG/DL (ref 65–140)
GLUCOSE SERPL-MCNC: 73 MG/DL (ref 65–140)
GLUCOSE SERPL-MCNC: 78 MG/DL (ref 65–140)
GLUCOSE SERPL-MCNC: 82 MG/DL (ref 65–140)
GLUCOSE SERPL-MCNC: 98 MG/DL (ref 65–140)
HCT VFR BLD AUTO: 36.3 % (ref 34.8–46.1)
HGB BLD-MCNC: 12 G/DL (ref 11.5–15.4)
MCH RBC QN AUTO: 30.5 PG (ref 26.8–34.3)
MCHC RBC AUTO-ENTMCNC: 33.1 G/DL (ref 31.4–37.4)
MCV RBC AUTO: 92 FL (ref 82–98)
PLATELET # BLD AUTO: 157 THOUSANDS/UL (ref 149–390)
PMV BLD AUTO: 14.3 FL (ref 8.9–12.7)
POTASSIUM SERPL-SCNC: 3.8 MMOL/L (ref 3.5–5.3)
PROT SERPL-MCNC: 5.9 G/DL (ref 6.4–8.4)
RBC # BLD AUTO: 3.93 MILLION/UL (ref 3.81–5.12)
RH BLD: POSITIVE
SODIUM SERPL-SCNC: 135 MMOL/L (ref 135–147)
SPECIMEN EXPIRATION DATE: NORMAL
TREPONEMA PALLIDUM IGG+IGM AB [PRESENCE] IN SERUM OR PLASMA BY IMMUNOASSAY: NORMAL
WBC # BLD AUTO: 11.87 THOUSAND/UL (ref 4.31–10.16)

## 2024-12-23 PROCEDURE — 4A1HXCZ MONITORING OF PRODUCTS OF CONCEPTION, CARDIAC RATE, EXTERNAL APPROACH: ICD-10-PCS

## 2024-12-23 PROCEDURE — 86850 RBC ANTIBODY SCREEN: CPT

## 2024-12-23 PROCEDURE — 86780 TREPONEMA PALLIDUM: CPT

## 2024-12-23 PROCEDURE — 85027 COMPLETE CBC AUTOMATED: CPT

## 2024-12-23 PROCEDURE — 86900 BLOOD TYPING SEROLOGIC ABO: CPT

## 2024-12-23 PROCEDURE — 10907ZC DRAINAGE OF AMNIOTIC FLUID, THERAPEUTIC FROM PRODUCTS OF CONCEPTION, VIA NATURAL OR ARTIFICIAL OPENING: ICD-10-PCS

## 2024-12-23 PROCEDURE — 82948 REAGENT STRIP/BLOOD GLUCOSE: CPT

## 2024-12-23 PROCEDURE — 86901 BLOOD TYPING SEROLOGIC RH(D): CPT

## 2024-12-23 PROCEDURE — PNV: Performed by: OBSTETRICS & GYNECOLOGY

## 2024-12-23 PROCEDURE — 80053 COMPREHEN METABOLIC PANEL: CPT

## 2024-12-23 PROCEDURE — NC001 PR NO CHARGE: Performed by: OBSTETRICS & GYNECOLOGY

## 2024-12-23 RX ORDER — BUPIVACAINE HYDROCHLORIDE 2.5 MG/ML
30 INJECTION, SOLUTION EPIDURAL; INFILTRATION; INTRACAUDAL ONCE AS NEEDED
Status: DISCONTINUED | OUTPATIENT
Start: 2024-12-23 | End: 2024-12-24

## 2024-12-23 RX ORDER — ONDANSETRON 2 MG/ML
4 INJECTION INTRAMUSCULAR; INTRAVENOUS EVERY 6 HOURS PRN
Status: DISCONTINUED | OUTPATIENT
Start: 2024-12-23 | End: 2024-12-24

## 2024-12-23 RX ORDER — BUTORPHANOL TARTRATE 1 MG/ML
1 INJECTION, SOLUTION INTRAMUSCULAR; INTRAVENOUS ONCE
Status: COMPLETED | OUTPATIENT
Start: 2024-12-23 | End: 2024-12-23

## 2024-12-23 RX ORDER — DIPHENHYDRAMINE HYDROCHLORIDE 50 MG/ML
12.5 INJECTION INTRAMUSCULAR; INTRAVENOUS EVERY 6 HOURS PRN
Status: DISCONTINUED | OUTPATIENT
Start: 2024-12-23 | End: 2024-12-24

## 2024-12-23 RX ORDER — SODIUM CHLORIDE 9 MG/ML
125 INJECTION, SOLUTION INTRAVENOUS CONTINUOUS
Status: DISCONTINUED | OUTPATIENT
Start: 2024-12-23 | End: 2024-12-24

## 2024-12-23 RX ORDER — ACETAMINOPHEN 325 MG/1
975 TABLET ORAL ONCE
Status: COMPLETED | OUTPATIENT
Start: 2024-12-24 | End: 2024-12-24

## 2024-12-23 RX ORDER — ROPIVACAINE HYDROCHLORIDE 2 MG/ML
INJECTION, SOLUTION EPIDURAL; INFILTRATION; PERINEURAL
Status: COMPLETED | OUTPATIENT
Start: 2024-12-23 | End: 2024-12-23

## 2024-12-23 RX ORDER — OXYTOCIN/RINGER'S LACTATE 30/500 ML
1-30 PLASTIC BAG, INJECTION (ML) INTRAVENOUS
Status: DISCONTINUED | OUTPATIENT
Start: 2024-12-23 | End: 2024-12-24

## 2024-12-23 RX ADMIN — BUTORPHANOL TARTRATE 1 MG: 1 INJECTION, SOLUTION INTRAMUSCULAR; INTRAVENOUS at 12:33

## 2024-12-23 RX ADMIN — ROPIVACAINE HYDROCHLORIDE 10 ML/HR: 2 INJECTION, SOLUTION EPIDURAL; INFILTRATION at 21:38

## 2024-12-23 RX ADMIN — ROPIVACAINE HYDROCHLORIDE 10 ML: 2 INJECTION, SOLUTION EPIDURAL; INFILTRATION at 21:35

## 2024-12-23 RX ADMIN — SODIUM CHLORIDE 125 ML/HR: 0.9 INJECTION, SOLUTION INTRAVENOUS at 16:01

## 2024-12-23 RX ADMIN — ROPIVACAINE HYDROCHLORIDE: 2 INJECTION, SOLUTION EPIDURAL; INFILTRATION at 21:44

## 2024-12-23 RX ADMIN — Medication 2 MILLI-UNITS/MIN: at 16:12

## 2024-12-23 RX ADMIN — ONDANSETRON 4 MG: 2 INJECTION INTRAMUSCULAR; INTRAVENOUS at 19:02

## 2024-12-23 RX ADMIN — SODIUM CHLORIDE 125 ML/HR: 0.9 INJECTION, SOLUTION INTRAVENOUS at 21:43

## 2024-12-23 RX ADMIN — Medication 25 MCG: at 11:58

## 2024-12-23 NOTE — OB LABOR/OXYTOCIN SAFETY PROGRESS
Labor Progress Note - Julia Haines 19 y.o. female MRN: [id number]    Unit/Bed#: -01 Encounter: [id number]                Cervical Dilation: 2        Cervical Effacement: 30  Fetal Station: Ballotable     Fetal Heart Rate (FHT): 162 BPM  FHR Category: I               Vital Signs:   Vitals:    12/23/24 1025   BP: 136/96   Pulse: 92   Resp: 18   Temp: 98.4 °F (36.9 °C)       Notes/comments:   PROCEDURE:  CHEATHAM BALLOON PLACEMENT    A 24F cheatham with a 30cc balloon was selected, SVE was performed and cervix was located, cheatham was introduced over sterile gloved hands. Balloon advanced through cervix beyond the internal cervical os. A small amount amount of sterile saline solution was instilled in the balloon to confirm placement. Placement was confirmed to be beyond the internal cervical os. A total of 60cc of sterile saline solution was placed into the balloon. Pt tolerated well. Notify MD when cheatham dislodged.    25 mcg vaginal cytotec placed after cheatham balloon    Angelia Marc MD 12/23/2024 11:59 AM

## 2024-12-23 NOTE — OB LABOR/OXYTOCIN SAFETY PROGRESS
Labor Progress Note - Julia Haines 19 y.o. female MRN: [id number]    Unit/Bed#: -01 Encounter: [id number]       Contraction Frequency (minutes): 3-5  Contraction Intensity: Mild/Moderate  Uterine Activity Characteristics: Irregular  Cervical Dilation: 4        Cervical Effacement: 50  Fetal Station: -3  Baseline Rate (FHR): 130 bpm  Fetal Heart Rate (FHT): 162 BPM  FHR Category: I               Vital Signs:   Vitals:    12/23/24 1544   BP: 138/100   Pulse: 78   Resp:    Temp:        Notes/comments:   [place] balloon expelled, SVE as above. She reports frequent contractions every several minutes. FHR category I. Plan to start pitocin. Dr. hSarp aware    Angelia Marc MD 12/23/2024 4:08 PM

## 2024-12-23 NOTE — PLAN OF CARE
Problem: ANTEPARTUM  Goal: Maintain pregnancy as long as maternal and/or fetal condition is stable  Description: INTERVENTIONS:  - Maternal surveillance  - Fetal surveillance  - Monitor uterine activity  - Medications as ordered  - Bedrest  Outcome: Progressing     Problem: BIRTH - VAGINAL/ SECTION  Goal: Fetal and maternal status remain reassuring during the birth process  Description: INTERVENTIONS:  - Monitor vital signs  - Monitor fetal heart rate  - Monitor uterine activity  - Monitor labor progression (vaginal delivery)  - DVT prophylaxis  - Antibiotic prophylaxis  Outcome: Progressing  Goal: Emotionally satisfying birthing experience for mother/fetus  Description: Interventions:  - Assess, plan, implement and evaluate the nursing care given to the patient in labor  - Advocate the philosophy that each childbirth experience is a unique experience and support the family's chosen level of involvement and control during the labor process   - Actively participate in both the patient's and family's teaching of the birth process  - Consider cultural, [employer]ism and age-specific factors and plan care for the patient in labor  Outcome: Progressing     Problem: PAIN - ADULT  Goal: Verbalizes/displays adequate comfort level or baseline comfort level  Description: Interventions:  - Encourage patient to monitor pain and request assistance  - Assess pain using appropriate pain scale  - Administer analgesics based on type and severity of pain and evaluate response  - Implement non-pharmacological measures as appropriate and evaluate response  - Consider cultural and social influences on pain and pain management  - Notify physician/advanced practitioner if interventions unsuccessful or patient reports new pain  Outcome: Progressing     Problem: INFECTION - ADULT  Goal: Absence or prevention of progression during hospitalization  Description: INTERVENTIONS:  - Assess and monitor for signs and symptoms of infection  -  Monitor lab/diagnostic results  - Monitor all insertion sites, i.e. indwelling lines, tubes, and drains  - Monitor endotracheal if appropriate and nasal secretions for changes in amount and color  - [place] appropriate cooling/warming therapies per order  - Administer medications as ordered  - Instruct and encourage patient and family to use good hand hygiene technique  - Identify and instruct in appropriate isolation precautions for identified infection/condition  Outcome: Progressing  Goal: Absence of fever/infection during neutropenic period  Description: INTERVENTIONS:  - Monitor WBC    Outcome: Progressing     Problem: SAFETY ADULT  Goal: Patient will remain free of falls  Description: INTERVENTIONS:  - Educate patient/family on patient safety including physical limitations  - Instruct patient to call for assistance with activity   - Consult OT/PT to assist with strengthening/mobility   - Keep Call bell within reach  - Keep bed low and locked with side rails adjusted as appropriate  - Keep care items and personal belongings within reach  - Initiate and maintain comfort rounds  - Make Fall Risk Sign visible to staff  - Apply yellow socks and bracelet for high fall risk patients  - Consider moving patient to room near nurses station  Outcome: Progressing  Goal: Maintain or return to baseline ADL function  Description: INTERVENTIONS:  -  Assess patient's ability to carry out ADLs; assess patient's baseline for ADL function and identify physical deficits which impact ability to perform ADLs (bathing, care of mouth/teeth, toileting, grooming, dressing, etc.)  - Assess/evaluate cause of self-care deficits   - Assess range of motion  - Assess patient's mobility; develop plan if impaired  - Assess patient's need for assistive devices and provide as appropriate  - Encourage maximum independence but intervene and supervise when necessary  - Involve family in performance of ADLs  - Assess for home care needs following  discharge   - Consider OT consult to assist with ADL evaluation and planning for discharge  - Provide patient education as appropriate  Outcome: Progressing  Goal: Maintains/Returns to pre admission functional level  Description: INTERVENTIONS:  - Perform AM-PAC 6 Click Basic Mobility/ Daily Activity assessment daily.  - Set and communicate daily mobility goal to care team and patient/family/caregiver.   - Collaborate with rehabilitation services on mobility goals if consulted  - Out of bed for toileting  - Record patient progress and toleration of activity level   Outcome: Progressing     Problem: DISCHARGE PLANNING  Goal: Discharge to home or other facility with appropriate resources  Description: INTERVENTIONS:  - Identify barriers to discharge w/patient and caregiver  - Arrange for needed discharge resources and transportation as appropriate  - Identify discharge learning needs (meds, wound care, etc.)  - Arrange for interpretive services to assist at discharge as needed  - Refer to Case Management Department for coordinating discharge planning if the patient needs post-hospital services based on physician/advanced practitioner order or complex needs related to functional status, cognitive ability, or social support system  Outcome: Progressing     Problem: Knowledge Deficit  Goal: Patient/family/caregiver demonstrates understanding of disease process, treatment plan, medications, and discharge instructions  Description: Complete learning assessment and assess knowledge base.  Interventions:  - Provide teaching at level of understanding  - Provide teaching via preferred learning methods  Outcome: Progressing

## 2024-12-23 NOTE — PROGRESS NOTES
"Assessment & Plan  19 y.o.  at 38w0d presenting for routine prenatal visit; today she meets criteria for preeclampsia without severe features, patient instructed to proceed to L&D for induction of labor      Problem List       Rubella non-immune status, antepartum    Overview   Re-vaccinate post partum         History of depression    Overview   Unmedicated, doing well.         38 weeks gestation of pregnancy    Overview   Prenatal labs normal.  Rubella non-immune  Normal nuchal translucency  NIPT normal; boy  20w anatomy normal  Flu [X]   [X] TDAP/ RSV  [X] consent  GBS negative  Contraception: pills         Diet controlled gestational diabetes mellitus (GDM) in third trimester    Overview   F/u growth ultrasound  24 32w: 65%  Repeat growth at 36w         Nausea & vomiting    Pre-eclampsia in third trimester    Overview   Elevated BP in triage last week with p/c ratio of 0.3; BP elevated in office today 142/96; patient reports pedal edema, but has no other signs/symptoms of severe preeclampsia  Discussed with patient recommendation for induction and delivery due to gestational age  Patient instructed to proceed to Labor and Delivery for induction, L&D team notified          Other Visit Diagnoses         Encounter for supervision of high risk pregnancy in third trimester, antepartum    -  Primary          ____________________________________________________________  Subjective  She is without complaint.   She denies contractions, loss of fluid, or vaginal bleeding.   She feels regular fetal movements.     Objective  /96 (BP Location: Left arm, Cuff Size: Large)   Pulse 77   Ht 5' 6\" (1.676 m)   Wt 93 kg (205 lb)   LMP 2024 (Exact Date)   BMI 33.09 kg/m²   FHR: 145 bpm  Fundal height 36 cm    Physical Exam  Constitutional:       Appearance: She is well-developed.   Cardiovascular:      Rate and Rhythm: Normal rate.   Pulmonary:      Effort: Pulmonary effort is normal. No respiratory " distress.   Abdominal:      Palpations: Abdomen is soft.      Tenderness: There is no abdominal tenderness.   Skin:     General: Skin is warm and dry.          Patient's Active Problem List  Patient Active Problem List   Diagnosis    Rubella non-immune status, antepartum    History of depression    38 weeks gestation of pregnancy    Diet controlled gestational diabetes mellitus (GDM) in third trimester    Nausea & vomiting    Pre-eclampsia in third trimester           Norma Caballero MD  12/23/2024  8:48 AM

## 2024-12-23 NOTE — ASSESSMENT & PLAN NOTE
Admit to OBGYN   Clear liquid diet   F/u T&S, CBC, RPR   IVF NS 125cc/hr   Continuous fetal monitoring and tocometry   Analgesia at maternal request   Vertex by TAUS  GBS neg  Induction plan cheatham balloon and cytotec, advance to pitocin  Postpartum contraception plan: POP bridge to OCP

## 2024-12-23 NOTE — H&P
H & P- Obstetrics   Julia Haines 19 y.o. female MRN: [id number]  Unit/Bed#:  307-01 Encounter: [id number]      Assessment/Plan:    Julia is a 19 y.o.  at 38w0d admitted for an induction of labor    SVE: Cervical Dilation: 2  Cervical Effacement: 30  Cervical Consistency: Medium  Fetal Station: Ballotable  Presentation: Vertex  Method: Manual  OB Examiner: EDITA    Pre-eclampsia in third trimester  Assessment & Plan  Indication for IOL  Continue to closely monitor blood pressures  CBC and CMP pending    Diet controlled gestational diabetes mellitus (GDM) in third trimester  Assessment & Plan  POCT glucose monitoring intrapartum per protocol    History of depression  Assessment & Plan  Follow up EPDS postpartum    Rubella non-immune status, antepartum  Assessment & Plan  Recommend vaccine postpartum    * Encounter for induction of labor  Assessment & Plan  Admit to OBGYN   Clear liquid diet   F/u T&S, CBC, RPR   IVF NS 125cc/hr   Continuous fetal monitoring and tocometry   Analgesia at maternal request   Vertex by TAUS  GBS neg  Induction plan cheatham balloon and cytotec, advance to pitocin  Postpartum contraception plan: POP bridge to OCP       Patient of: [facility] (Dr. Olmstead, Dr. Shapr, Dr. Caballero)  This patient will be an INPATIENT  and I certify the anticipated length of stay is >2 Midnights  Discussed with Dr. Sharp      SUBJECTIVE:    Chief Complaint: I am here for my induction    HPI: Julia Haines is a 19 y.o.  with an MARIN of 2025, by Ultrasound at 38w0d who is being admitted for induction of labor for preeclampsia without severe features. She is asymptomatic from a preeclampsia perspective and denies headache, vision changes, shortness of breath, chest pain, and RUQ pain. She denies having uterine contractions, has no LOF, and reports no VB. She states she has felt good FM. This pregnancy is complicated by A1GDM, preeclampsia without severe features, and rubella non  immune. All other review of systems is negative.       Pregnancy Plan:  Pregnancy: Florentino     Delivery Plans  Planned delivery method: Vaginal  Planned delivery location: [place] [facility]  Acceptable blood products: All     Post-Delivery Plans  Feeding intentions: Breast Milk      Patient Active Problem List   Diagnosis    Rubella non-immune status, antepartum    History of depression    38 weeks gestation of pregnancy    Diet controlled gestational diabetes mellitus (GDM) in third trimester    Nausea & vomiting    Pre-eclampsia in third trimester    Encounter for induction of labor       OB History    Para Term  AB Living   1 0 0 0 0 0   SAB IAB Ectopic Multiple Live Births   0 0 0 0 0      # Outcome Date GA Lbr Sin/2nd Weight Sex Type Anes PTL Lv   1 Current               Obstetric Comments   Menarche: 10   Cycles historically irregular, bled for months at a time.  Started OCP at age 12.  Discontinued 2023, now skipping cycles.   Gardasil completed       Past Medical History:   Diagnosis Date    ADHD (attention deficit hyperactivity disorder)     not currently medicated    Anxiety     Attention deficit hyperactivity disorder (ADHD), predominantly inattentive type 2020    Started adderall 3/2020 - stopped Dec 2020 - she wasn't using it     Last Assessment & Plan:    - Stable   - Not currently taking any medications      Depression     Developmental delay 2009    History of menorrhagia     treated with OCP    History of palpitations     evaluated 2023; metoprolol    History of suicidal ideation 2022    few years ago, recovered    Seasonal allergic rhinitis     Varicella        Past Surgical History:   Procedure Laterality Date    AMNIOTIC FLUID INDEX WITH NST  2024    ROOT CANAL Left 2024    WISDOM TOOTH EXTRACTION         Social History     Tobacco Use    Smoking status: Never    Smokeless tobacco: Never   Substance Use Topics    Alcohol use: Never       Allergies   Allergen  Reactions    Shellfish Allergy - Food Allergy Anaphylaxis    Shellfish-Derived Products - Food Allergy Other (See Comments)     unknown    Octacosanol Allergic Rhinitis     Coughing, sneezing    Pollen Extract Drowsiness     Coughing, sneezing         Medications Prior to Admission:     Blood Glucose Monitoring Suppl (OneTouch Verio Flex System) w/Device KIT    Lancets (OneTouch Delica Plus [id number]) MISC    ondansetron (ZOFRAN-ODT) 4 mg disintegrating tablet    OneTouch Verio test strip    Prenatal Multivit-Min-Fe-FA (PRENATAL 1 + IRON PO)        OBJECTIVE:  Vitals:  Temp:  [98.4 °F (36.9 °C)] 98.4 °F (36.9 °C)  HR:  [77-92] 92  BP: (136-142)/(96) 136/96  Resp:  [18] 18  Body mass index is 33.09 kg/m².     Physical Exam:  General: Well appearing, no distress  Respiratory: Unlabored breathing, lungs clear to auscultation bilaterally  Cardiovascular: Regular rate and rhythm  Abdomen: Soft, gravid, nontender  Fundal Height: Appropriate for gestational age.  Extremities: Warm and well perfused.  Non tender.  Psychiatric: Behavioral normal    FHT:  Baseline 150, moderate variability, 15x15 accelerations, absent decelerations    TOCO:   Occasional contractions      Prenatal Labs: I have personally reviewed pertinent reports.  Blood Type:   Lab Results   Component Value Date/Time    ABO Grouping A 06/28/2024 09:58 AM   D (Rh type):   Lab Results   Component Value Date/Time    Rh Factor Positive 06/28/2024 09:58 AM   Antibody Screen: Negative  HCT/HGB:   Lab Results   Component Value Date/Time    Hematocrit 36.4 12/19/2024 11:49 PM    Hemoglobin 12.4 12/19/2024 11:49 PM   MCV:   Lab Results   Component Value Date/Time    MCV 92 12/19/2024 11:49 PM   Platelets:   Lab Results   Component Value Date/Time    Platelets 188 12/19/2024 11:49 PM   1 hour Glucola:   Lab Results   Component Value Date/Time    Glucose 157 (H) 10/04/2024 11:29 AM   3 hour GTT:   Lab Results   Component Value Date/Time    Glucose, GTT - 3 Hour 141 (H)  "10/11/2024 01:30 PM   Rubella:   Lab Results   Component Value Date/Time    Rubella IgG Quant 14.1 (L) 06/28/2024 09:58 AM   VDRL/RPR: Non reactive  Urine Culture/Screen:   Lab Results   Component Value Date/Time    Urine Culture 10,000-19,000 cfu/ml 11/11/2024 11:47 AM   Hep B:   Lab Results   Component Value Date/Time    Hepatitis B Surface Ag Non-reactive 06/28/2024 09:58 AM   Hep C:   Lab Results   Component Value Date/Time    Hepatitis C Ab Non-reactive 06/28/2024 09:58 AM   HIV: Non reactive  Chlamydia: Negative  Gonorrhea:   Lab Results   Component Value Date/Time    N gonorrhoeae, DNA Probe Negative 12/16/2024 10:50 AM   Group B Strep:    Lab Results   Component Value Date/Time    Strep Grp B PCR Negative 12/02/2024 10:48 AM            Angelia Marc MD  12/23/2024  10:56 AM        Portions of the record may have been created with voice recognition software.  Occasional wrong word or \"sound a like\" substitutions may have occurred due to the inherent limitations of voice recognition software.  Read the chart carefully and recognize, using context, where substitutions have occurred    "

## 2024-12-24 LAB
BASE EXCESS BLDCOA CALC-SCNC: -7.5 MMOL/L (ref 3–11)
BASE EXCESS BLDCOV CALC-SCNC: -7.4 MMOL/L (ref 1–9)
HCO3 BLDCOA-SCNC: 19 MMOL/L (ref 17.3–27.3)
HCO3 BLDCOV-SCNC: 17.9 MMOL/L (ref 12.2–28.6)
O2 CT VFR BLDCOA CALC: 11.8 ML/DL
OXYHGB MFR BLDCOA: 55.1 %
OXYHGB MFR BLDCOV: 71.7 %
PCO2 BLDCOA: 42.2 MM[HG] (ref 30–60)
PCO2 BLDCOV: 35.7 MM HG (ref 27–43)
PH BLDCOA: 7.27 [PH] (ref 7.23–7.43)
PH BLDCOV: 7.32 [PH] (ref 7.19–7.49)
PO2 BLDCOA: 22.9 MM HG (ref 5–25)
PO2 BLDCOV: 29.8 MM HG (ref 15–45)
SAO2 % BLDCOV: 15 ML/DL

## 2024-12-24 PROCEDURE — 59400 OBSTETRICAL CARE: CPT | Performed by: OBSTETRICS & GYNECOLOGY

## 2024-12-24 PROCEDURE — 82805 BLOOD GASES W/O2 SATURATION: CPT | Performed by: OBSTETRICS & GYNECOLOGY

## 2024-12-24 PROCEDURE — 0UQMXZZ REPAIR VULVA, EXTERNAL APPROACH: ICD-10-PCS | Performed by: OBSTETRICS & GYNECOLOGY

## 2024-12-24 PROCEDURE — 0HQ9XZZ REPAIR PERINEUM SKIN, EXTERNAL APPROACH: ICD-10-PCS | Performed by: OBSTETRICS & GYNECOLOGY

## 2024-12-24 RX ORDER — CALCIUM CARBONATE 500 MG/1
1000 TABLET, CHEWABLE ORAL 3 TIMES DAILY PRN
Status: DISCONTINUED | OUTPATIENT
Start: 2024-12-24 | End: 2024-12-25 | Stop reason: HOSPADM

## 2024-12-24 RX ORDER — OXYTOCIN/RINGER'S LACTATE 30/500 ML
250 PLASTIC BAG, INJECTION (ML) INTRAVENOUS CONTINUOUS
Status: ACTIVE | OUTPATIENT
Start: 2024-12-24 | End: 2024-12-24

## 2024-12-24 RX ORDER — SIMETHICONE 80 MG
80 TABLET,CHEWABLE ORAL 4 TIMES DAILY PRN
Status: DISCONTINUED | OUTPATIENT
Start: 2024-12-24 | End: 2024-12-25 | Stop reason: HOSPADM

## 2024-12-24 RX ORDER — DOCUSATE SODIUM 100 MG/1
100 CAPSULE, LIQUID FILLED ORAL 2 TIMES DAILY
Status: DISCONTINUED | OUTPATIENT
Start: 2024-12-24 | End: 2024-12-25 | Stop reason: HOSPADM

## 2024-12-24 RX ORDER — DIPHENHYDRAMINE HCL 25 MG
25 TABLET ORAL EVERY 6 HOURS PRN
Status: DISCONTINUED | OUTPATIENT
Start: 2024-12-24 | End: 2024-12-25 | Stop reason: HOSPADM

## 2024-12-24 RX ORDER — BENZOCAINE/MENTHOL 6 MG-10 MG
1 LOZENGE MUCOUS MEMBRANE DAILY PRN
Status: DISCONTINUED | OUTPATIENT
Start: 2024-12-24 | End: 2024-12-25 | Stop reason: HOSPADM

## 2024-12-24 RX ORDER — ONDANSETRON 2 MG/ML
4 INJECTION INTRAMUSCULAR; INTRAVENOUS EVERY 8 HOURS PRN
Status: DISCONTINUED | OUTPATIENT
Start: 2024-12-24 | End: 2024-12-25 | Stop reason: HOSPADM

## 2024-12-24 RX ORDER — MAGNESIUM HYDROXIDE/ALUMINUM HYDROXICE/SIMETHICONE 120; 1200; 1200 MG/30ML; MG/30ML; MG/30ML
15 SUSPENSION ORAL EVERY 6 HOURS PRN
Status: DISCONTINUED | OUTPATIENT
Start: 2024-12-24 | End: 2024-12-25 | Stop reason: HOSPADM

## 2024-12-24 RX ORDER — ACETAMINOPHEN 325 MG/1
650 TABLET ORAL EVERY 6 HOURS SCHEDULED
Status: DISCONTINUED | OUTPATIENT
Start: 2024-12-24 | End: 2024-12-25

## 2024-12-24 RX ORDER — SENNOSIDES 8.6 MG
1 TABLET ORAL DAILY
Status: DISCONTINUED | OUTPATIENT
Start: 2024-12-24 | End: 2024-12-25 | Stop reason: HOSPADM

## 2024-12-24 RX ORDER — TRANEXAMIC ACID 10 MG/ML
1000 INJECTION, SOLUTION INTRAVENOUS ONCE
Status: COMPLETED | OUTPATIENT
Start: 2024-12-24 | End: 2024-12-24

## 2024-12-24 RX ORDER — IBUPROFEN 600 MG/1
600 TABLET, FILM COATED ORAL EVERY 6 HOURS SCHEDULED
Status: DISCONTINUED | OUTPATIENT
Start: 2024-12-24 | End: 2024-12-25

## 2024-12-24 RX ADMIN — SENNOSIDES 8.6 MG: 8.6 TABLET, FILM COATED ORAL at 08:15

## 2024-12-24 RX ADMIN — TRANEXAMIC ACID 1000 MG: 10 INJECTION, SOLUTION INTRAVENOUS at 04:10

## 2024-12-24 RX ADMIN — AMPICILLIN SODIUM AND SULBACTAM SODIUM 3 G: 100; 50 INJECTION, POWDER, FOR SOLUTION INTRAVENOUS at 00:37

## 2024-12-24 RX ADMIN — BENZOCAINE AND LEVOMENTHOL 1 APPLICATION: 200; 5 SPRAY TOPICAL at 08:13

## 2024-12-24 RX ADMIN — ACETAMINOPHEN 650 MG: 325 TABLET, FILM COATED ORAL at 17:53

## 2024-12-24 RX ADMIN — IBUPROFEN 600 MG: 600 TABLET, FILM COATED ORAL at 06:23

## 2024-12-24 RX ADMIN — IBUPROFEN 600 MG: 600 TABLET, FILM COATED ORAL at 17:53

## 2024-12-24 RX ADMIN — ACETAMINOPHEN 650 MG: 325 TABLET, FILM COATED ORAL at 06:23

## 2024-12-24 RX ADMIN — ACETAMINOPHEN 650 MG: 325 TABLET, FILM COATED ORAL at 11:53

## 2024-12-24 RX ADMIN — IBUPROFEN 600 MG: 600 TABLET, FILM COATED ORAL at 11:53

## 2024-12-24 RX ADMIN — ACETAMINOPHEN 975 MG: 325 TABLET ORAL at 00:07

## 2024-12-24 RX ADMIN — DOCUSATE SODIUM 100 MG: 100 CAPSULE, LIQUID FILLED ORAL at 08:15

## 2024-12-24 RX ADMIN — AMPICILLIN SODIUM AND SULBACTAM SODIUM 3 G: 100; 50 INJECTION, POWDER, FOR SOLUTION INTRAVENOUS at 17:53

## 2024-12-24 RX ADMIN — AMPICILLIN SODIUM AND SULBACTAM SODIUM 3 G: 100; 50 INJECTION, POWDER, FOR SOLUTION INTRAVENOUS at 12:30

## 2024-12-24 RX ADMIN — DOCUSATE SODIUM 100 MG: 100 CAPSULE, LIQUID FILLED ORAL at 17:53

## 2024-12-24 RX ADMIN — Medication 250 MILLI-UNITS/MIN: at 04:30

## 2024-12-24 RX ADMIN — AMPICILLIN SODIUM AND SULBACTAM SODIUM 3 G: 100; 50 INJECTION, POWDER, FOR SOLUTION INTRAVENOUS at 06:27

## 2024-12-24 NOTE — ANESTHESIA PREPROCEDURE EVALUATION
Procedure:  LABOR ANALGESIA    Relevant Problems   CARDIO   (+) Pre-eclampsia in third trimester      GYN   (+) 38 weeks gestation of pregnancy      Obstetrics/Gynecology   (+) Diet controlled gestational diabetes mellitus (GDM) in third trimester        Physical Exam    Airway    Mallampati score: II         Dental       Cardiovascular  Rhythm: regular, Rate: normal    Pulmonary   Breath sounds clear to auscultation    Other Findings  post-pubertal.      Anesthesia Plan  ASA Score- 3     Anesthesia Type- epidural with ASA Monitors.         Additional Monitors:     Airway Plan:            Plan Factors-Exercise tolerance (METS): >4 METS.    Chart reviewed.   Existing labs reviewed. Patient summary reviewed.          Obstructive sleep apnea risk education given perioperatively.        Induction- intravenous.    Postoperative Plan-     Perioperative Resuscitation Plan - Level 1 - Full Code.       Informed Consent- Anesthetic plan and risks discussed with patient.

## 2024-12-24 NOTE — ANESTHESIA POSTPROCEDURE EVALUATION
Post-Op Assessment Note    CV Status:  Stable  Pain Score: 1    Pain management: adequate      Post-op block assessment: no complications and catheter intact   Mental Status:  Alert and awake   Hydration Status:  Euvolemic   PONV Controlled:  Controlled   Airway Patency:  Patent     Post Op Vitals Reviewed: Yes    No anethesia notable event occurred.    Staff: Anesthesiologist           Last Filed PACU Vitals:  Vitals Value Taken Time   Temp     Pulse 92 12/24/24 0600   /87 12/24/24 0600   Resp     SpO2     Vitals shown include unfiled device data.    Modified Smith:  No data recorded

## 2024-12-24 NOTE — LACTATION NOTE
This note was copied from a baby's chart.    In to see NEW Family states was struggling with latch and wanted help on left breast. States just fed on right and was supplementing with formula. Worked on positioning infant up at chest level and starting to feed infant with nose arriving at the nipple. Then, using areolar compression to achieve a deep latch that is comfortable and exchanges optimum amounts of milk.  Guided dyad to deep latch, using breast compressions & stimulation to keep rocker suckling till popped off with relaxed tone. Nursing parent  was able to note signs of a good feeding like: less discomfort after latch on tenderness, good rocker suckling with stimulation, breast softening; rounded nipple and relaxed tone after nursing at breast.       12/24/24 1400   Maternal Information   Has mother  before? No   Infant to breast within first hour of birth? Yes   Exclusive Pump and Bottle Feed No   LATCH Documentation   Latch 1   Audible Swallowing 1   Type of Nipple 2   Comfort (Breast/Nipple) 1   Hold (Positioning) 1   LATCH Score 6   Having latch problems? No   Position(s) Used Football   Breasts/Nipples   Left Breast Soft   Right Breast Soft   Left Nipple Everted   Right Nipple Everted   Intervention Other (comment);Hand expression   Breastfeeding Progress Not yet established   Reasons for not Breastfeeding Infant medical condition   Other OB Lactation Tools   Feeding Devices Syringe;Bottle   Breast Pump   Pump 3  (has MomCozi)   Patient Follow-Up   Lactation Consult Status 2   Follow-Up Type Inpatient;Call as needed   Other OB Lactation Documentation    Additional Problem Noted Follow up for assistance with positioning for deep latch  (BOTH Booklets at bedside)     Encouraged parents to call for assistance, questions, and concerns about breastfeeding.

## 2024-12-24 NOTE — DISCHARGE SUMMARY
Discharge Summary - OB/GYN   Name: Julia Haines 19 y.o. female I MRN: [id number]  Unit/Bed#: -01 I Date of Admission: 2024   Date of Service: 2024 I Hospital Day: 2    ADMISSION  Patient of: [facility] (Dr. Olmstead, Dr. Sharp, Dr. Caballero)  Admission Date: 2024   Admitting Attending: Dr. Tim Sharp MD  Admitting Diagnoses:   Patient Active Problem List   Diagnosis    Rubella non-immune status, antepartum    History of depression    38 weeks gestation of pregnancy    Diet controlled gestational diabetes mellitus (GDM) in third trimester    Nausea & vomiting    Pre-eclampsia in third trimester     (spontaneous vaginal delivery)       DELIVERY  Delivery Method:   Delivery Date and Time: 2024 3:53 AM  Delivery Attending: Tim Sharp    DISCHARGE  Discharge Date: 24  Discharge Attending: Dr. Marquez  Discharge Diagnosis:   Same, Delivered    Clinical course: Admission to Delivery  Julia Haines is a 19 y.o.  who was admitted at 38w1d for diagnosis of preeclampsia without severe features. Pregnancy was otherwise also complicated by A1GDM and rubella non-immunity. On admission, SVE was 2/30/-4. IOL was started with FB and cytotec. FB was expelled and pitocin titration was started. She received an epidural. She made progress to 7/70/-2, at which time she was noted to have a fever to 100.6 deg F and fetal tachycardia was noted on FHT. She was started on Unasyn for presumed chorioamnionitis. She progressed to complete and began pushing.     Delivery  Route of Delivery:     Anesthesia: Epidural,   QBL: Non-Surgical QBL (mL): 33        Delivery:   at 2024 3:53 AM  Laceration: Perineal:   Repaired?      Baby's Weight: 3300 g (7 lb 4.4 oz); 116.4    Apgar scores: 8  and 9  at 1 and 5 minutes, respectively    Clinical Course: Post-Delivery:  The post delivery course was unremarkable. Her blood pressures were well controlled without medication and  she was recommended to follow up in the office for blood pressure check within a week of discharge. She was maintained on Unasyn for 24 hours after delivery and had no additional fevers or symptoms.    On the day of discharge, the patient was ambulating, voiding spontaneously, tolerating oral intake, and hemodynamically stable. She was able to reasonably perform all ADLs. She had appropriate bowel function. Pain was well-controlled. She was discharged home on postpartum/postop day #1 without complications. Patient was instructed to follow up with her OB as an outpatient and was given appropriate warnings to call her provider with problems or concerns.    Pertinent lab findings included:   Blood type A+.     Last three Hgb values:  Lab Results   Component Value Date    HGB 10.2 (L) 2024    HGB 12.0 2024    HGB 12.4 2024        Problem-specific follow-up plans included the following:  Assessment & Plan   (spontaneous vaginal delivery)  Admit to OBGYN   Clear liquid diet   F/u T&S, CBC, RPR   IVF NS 125cc/hr   Continuous fetal monitoring and tocometry   Analgesia at maternal request   Vertex by TAUS  GBS neg  Induction plan cheatham balloon and cytotec, advance to pitocin  Postpartum contraception plan: POP bridge to OCP  Rubella non-immune status, antepartum  Recommend vaccine postpartum  History of depression  Follow up EPDS postpartum  Diet controlled gestational diabetes mellitus (GDM) in third trimester  POCT glucose monitoring intrapartum per protocol  Pre-eclampsia in third trimester  Indication for IOL  Continue to closely monitor blood pressures  CBC and CMP pending    Discharge med list:     Medication List      CONTINUE taking these medications     OneTouch Delica Plus [id number] Misc; Test 4 times ddsarbjit   OneTouch Verio Flex System w/Device Kit; Test 4 times daily     ASK your doctor about these medications     ondansetron 4 mg disintegrating tablet; Commonly known as: ZOFRAN-ODT;   Take 1  tablet (4 mg total) by mouth every 6 (six) hours as needed for   nausea or vomiting   OneTouch Verio test strip; Generic drug: glucose blood; Test 4 times   daily   PRENATAL 1 + IRON PO       Condition at discharge:   good     Disposition:   Home    Planned Readmission:   No    Angelia Marc MD  PGY 2, Obstetrics and Gynecology  12/25/2024  3:55 PM

## 2024-12-24 NOTE — L&D DELIVERY NOTE
Vaginal Delivery Summary - OB/GYN   Julia Haines 19 y.o. female MRN: [id number]  Unit/Bed#: -01 Encounter: [id number]      Pre-delivery Diagnosis:   1. Pregnancy at 38w1d   2. Preeclampsia without severe features  3. Rubella non-immune  4. A1GDM    Post-delivery Diagnosis: same, delivered    Procedure: Spontaneous Vaginal Delivery     Attending: Dr. Sharp    Assistant(s): Dr. Pettit    Anesthesia: Epidural    QBL: 33 mL    Complications: none apparent    Specimens:   1. Arterial and venous cord gases  2. Cord blood  3. Segment of umbilical cord  4. Placenta to storage     Findings:  1. Viable male on 2024 at 0353, with APGARS of 8 and 9 at 1 and 5 minutes respectively,  2. Spontaneous delivery of intact placenta at 0357  3. Right periurethral laceration repaired with 3-0 vicryl rapide  4. 1st degree perineal laceration degree laceration repaired with 3-0 vicryl rapide  5. Umbilical Cord Venous Blood Gas:  Results from last 7 days   Lab Units 24  0354   PH COV  7.317   PCO2 COV mm HG 35.7   HCO3 COV mmol/L 17.9   BASE EXC COV mmol/L -7.4*   O2 CT CD VB mL/dL 15.0   O2 HGB, VENOUS CORD % 71.7     6. Umbilical Cord Arterial Blood Gas:  Results from last 7 days   Lab Units 24  0354   PH COA  7.272   PCO2 COA  42.2   PO2 COA mm HG 22.9   HCO3 COA mmol/L 19.0   BASE EXC COA mmol/L -7.5*   O2 CONTENT CORD ART ml/dl 11.8   O2 HGB, ARTERIAL CORD % 55.1         Disposition:  Patient tolerated the procedure well and was recovering in labor and delivery room.     Brief history and labor course:  Julia Haines is a 19 y.o.  who was admitted at 38w1d for diagnosis of preeclampsia without severe features. Pregnancy was otherwise also complicated by A1GDM and rubella non-immunity. On admission, SVE was /-4. IOL was started with FB and cytotec. FB was expelled and pitocin titration was started. She received an epidural. She made progress to /-2, at which time she was noted to have a  fever to 100.6 deg F and fetal tachycardia was noted on FHT. She was started on Unasyn for presumed chorioamnionitis. She progressed to complete and began pushing.     Description of procedure:  After pushing for 1 hour and 43 minutes, at 0353 patient delivered a viable male , wt pending, apgars of 8 (1 min) and 9 (5 min). The fetal vertex delivered spontaneously. There was no nuchal cord. Baby was OA, restituted to WANDER. The right anterior shoulder delivered atraumatically with maternal expulsive forces and the assistance of gentle downward traction. The left posterior shoulder delivered with maternal expulsive forces and the assistance of gentle upward traction. The remainder of the fetus delivered spontaneously.     Upon delivery, the infant was placed on the mothers abdomen and the cord was clamped and cut. The infant was noted to cry spontaneously and was moving all extremities appropriately. There was no evidence for injury. Awaiting nurse resuscitators evaluated the . Arterial and venous cord blood gases and cord blood was collected for analysis. These were promptly sent to the lab. In the immediate post-partum, 30 units of IV pitocin was administered, and the uterus was noted to contract down well with massage and pitocin. The placenta delivered spontaneously at 0357 and was noted to have a centrally inserted 3 vessel cord.     The vagina, cervix, perineum, and rectum were inspected and there was noted to be a first degree perineal laceration and R periurethral laceration. Patient was comfortable with epidural at that time. Attention was first turned to the first degree perineal laceration, which was repaired with a figure-of-eight stitch of 3-0 Vicryl rapide. A second interrupted stitch of the same was used to reinforce the laceration repair. Attention was then turned to the R periurethral tear. This was repaired with multiple interrupted stitches of 3-0 vicryl rapide. Vagina was noted to be  largely denuded with some oozing from the lacerations, and TXA was administered.    At the conclusion of the procedure, all needle, sponge, and instrument counts were noted to be correct. Patient tolerated the procedure well and was allowed to recover in labor and delivery room with family and  before being transferred to the post-partum floor. Dr. Sharp was present and participated in all key portions of the case.      Estefanía Pettit MD  OB/GYN PGY-2  2024  4:18 AM

## 2024-12-24 NOTE — PLAN OF CARE
Problem: PAIN - ADULT  Goal: Verbalizes/displays adequate comfort level or baseline comfort level  Description: Interventions:  - Encourage patient to monitor pain and request assistance  - Assess pain using appropriate pain scale  - Administer analgesics based on type and severity of pain and evaluate response  - Implement non-pharmacological measures as appropriate and evaluate response  - Consider cultural and social influences on pain and pain management  - Notify physician/advanced practitioner if interventions unsuccessful or patient reports new pain  Outcome: Progressing     Problem: INFECTION - ADULT  Goal: Absence or prevention of progression during hospitalization  Description: INTERVENTIONS:  - Assess and monitor for signs and symptoms of infection  - Monitor lab/diagnostic results  - Monitor all insertion sites, i.e. indwelling lines, tubes, and drains  - Monitor endotracheal if appropriate and nasal secretions for changes in amount and color  - [place] appropriate cooling/warming therapies per order  - Administer medications as ordered  - Instruct and encourage patient and family to use good hand hygiene technique  - Identify and instruct in appropriate isolation precautions for identified infection/condition  Outcome: Progressing  Goal: Absence of fever/infection during neutropenic period  Description: INTERVENTIONS:  - Monitor WBC    Outcome: Progressing     Problem: SAFETY ADULT  Goal: Patient will remain free of falls  Description: INTERVENTIONS:  - Educate patient/family on patient safety including physical limitations  - Instruct patient to call for assistance with activity   - Consult OT/PT to assist with strengthening/mobility   - Keep Call bell within reach  - Keep bed low and locked with side rails adjusted as appropriate  - Keep care items and personal belongings within reach  - Initiate and maintain comfort rounds  - Make Fall Risk Sign visible to staff  - Offer Toileting every  Hours,  in advance of need  - Initiate/Maintain alarm  - Obtain necessary fall risk management equipment:   - Apply yellow socks and bracelet for high fall risk patients  - Consider moving patient to room near nurses station  Outcome: Progressing  Goal: Maintain or return to baseline ADL function  Description: INTERVENTIONS:  -  Assess patient's ability to carry out ADLs; assess patient's baseline for ADL function and identify physical deficits which impact ability to perform ADLs (bathing, care of mouth/teeth, toileting, grooming, dressing, etc.)  - Assess/evaluate cause of self-care deficits   - Assess range of motion  - Assess patient's mobility; develop plan if impaired  - Assess patient's need for assistive devices and provide as appropriate  - Encourage maximum independence but intervene and supervise when necessary  - Involve family in performance of ADLs  - Assess for home care needs following discharge   - Consider OT consult to assist with ADL evaluation and planning for discharge  - Provide patient education as appropriate  Outcome: Progressing  Goal: Maintains/Returns to pre admission functional level  Description: INTERVENTIONS:  - Perform AM-PAC 6 Click Basic Mobility/ Daily Activity assessment daily.  - Set and communicate daily mobility goal to care team and patient/family/caregiver.   - Collaborate with rehabilitation services on mobility goals if consulted  - Record patient progress and toleration of activity level   Outcome: Progressing     Problem: DISCHARGE PLANNING  Goal: Discharge to home or other facility with appropriate resources  Description: INTERVENTIONS:  - Identify barriers to discharge w/patient and caregiver  - Arrange for needed discharge resources and transportation as appropriate  - Identify discharge learning needs (meds, wound care, etc.)  - Arrange for interpretive services to assist at discharge as needed  - Refer to Case Management Department for coordinating discharge planning if  the patient needs post-hospital services based on physician/advanced practitioner order or complex needs related to functional status, cognitive ability, or social support system  Outcome: Progressing     Problem: Knowledge Deficit  Goal: Patient/family/caregiver demonstrates understanding of disease process, treatment plan, medications, and discharge instructions  Description: Complete learning assessment and assess knowledge base.  Interventions:  - Provide teaching at level of understanding  - Provide teaching via preferred learning methods  Outcome: Progressing

## 2024-12-24 NOTE — OB LABOR/OXYTOCIN SAFETY PROGRESS
Oxytocin Safety Progress Check Note - Julia Haines 19 y.o. female MRN: [id number]    Unit/Bed#: -01 Encounter: [id number]    Dose (hudson-units/min) Oxytocin: 10 hudson-units/min  Contraction Frequency (minutes): 2-3  Contraction Intensity: Mild/Moderate  Uterine Activity Characteristics: Irregular  Cervical Dilation: 7        Cervical Effacement: 70  Fetal Station: -2  Baseline Rate (FHR): 165 bpm  Fetal Heart Rate (FHT): 162 BPM  FHR Category: 2               Vital Signs:   Vitals:    12/23/24 2330   BP: 135/88   Pulse: 86   Resp:    Temp:    SpO2:      Patient due for check. SVE with good change, as above. FHT notable for tachycardia and patient with fever of 100.6: plan to give tylenol and unasyn. AROM for clear fluid. Continue pitocin titration as tolerated. Dr. Sharp aware.    Estefanía Pettit MD 12/23/2024 11:54 PM

## 2024-12-24 NOTE — LACTATION NOTE
This note was copied from a baby's chart.  CONSULT - LACTATION  Baby Boy Haines (Hailie) 0 days male MRN: [id number]    [facility] NURSERY Room / Bed: (N)/(N) Encounter: [id number]    Maternal Information     MOTHER:  Julia Haines  Maternal Age: 19 y.o.  OB History: # 1 - Date: 24, Sex: Male, Weight: 3300 g (7 lb 4.4 oz), GA: 38w1d, Type: None, Apgar1: 8, Apgar5: 9, Living: Living, Birth Comments: None   Previouse breast reduction surgery? No    Lactation history:   Has patient previously breast fed:     How long had patient previously breast fed:     Previous breast feeding complications:       Past Surgical History:   Procedure Laterality Date    AMNIOTIC FLUID INDEX WITH NST  2024    ROOT CANAL Left 2024    WISDOM TOOTH EXTRACTION         Birth information:  YOB: 2026   Time of birth: 3:53 AM   Sex: male   Delivery type:     Birth Weight: 3300 g (7 lb 4.4 oz)   Percent of Weight Change: 0%     Gestational Age: 38w1d   [unfilled]    Assessment     Breast and nipple assessment:  Small wide-spaced breasts      Assessment: sleepy    Feeding assessment: feeding well with guidance     LATCH:  Latch: Repeated attempts, hold nipple in mouth, stimulate to suck   Audible Swallowing: A few with stimulation   Type of Nipple: Everted (After stimulation)   Comfort (Breast/Nipple): Soft/non-tender   Hold (Positioning): Partial assist, teach one side, mother does other, staff holds   LATCH Score: 7          Feeding recommendations:  breast feed on demand      In to see new family. BOY with low blood sugars.   Education on positioning and alignment. Mom is encouraged to:     - Bring baby up to the breast (use of pillows to elevate so baby's torso is against mom's breasts)   - Skin to skin for feedings with top hand exposed to show signs of satiation   - Chin deep into breast tissue (make baby look up to the nipple)   - nose aligned  to the nipple   -Wait for wide gape, drag chin on the breast so nipple is aimed at the upper, back palate  - Cheek should be touching breast   - Deep, firm hold of baby with ear, shoulder, hip alignment    Mom wanted help feeding on left breast. Football hold used to work on consistent deep latch. Worked on positioning infant up at chest level and starting to feed infant with nose arriving at the nipple. Then, using areolar compression to achieve a deep latch that is comfortable and exchanges optimum amounts of milk.  Dyad guided to multiple deep latches with some rocker suckling till popped off with relaxed tone.  Nursing parent  was able to note signs of a good feeding like: less discomfort after latch on tenderness, good rocker suckling with stimulation, breast softening; rounded nipple and relaxed tone after nursing at breast.  Then supplemented with 10cc of Similac with syringe for low blood sugars.      12/24/24 0930   Breasts/Nipples   Left Breast Soft;Other (Comment)  (small, wide-spaced breasts)   Right Breast Soft;Other (Comment)  (small, wide- spaced breasts)   Left Nipple Everted   Right Nipple Everted   Intervention Other (comment);Hand expression  (Overview good latch/feed, booklets & support available)   Breastfeeding Progress Not yet established   Reasons for not Breastfeeding Infant medical condition  (BOY with low blood sugars)   Other OB Lactation Tools   Feeding Devices Syringe;Bottle   Breast Pump   Pump 3  (has [employer] from Insurance)   Patient Follow-Up   Lactation Consult Status 2   Follow-Up Type Inpatient;Call as needed   Other OB Lactation Documentation    Additional Problem Noted Initial visist- helped with positioning/maintaining deep latch; alternative feeding methods for supplementation for low blood sugars  (Ready, Set Baby & Discharge Booklets @ bedside)     Encouraged parents to call for assistance, questions, and concerns about breastfeeding.       Yesika Alonzo RN 12/24/2024  10:41 AM

## 2024-12-24 NOTE — ANESTHESIA PROCEDURE NOTES
Epidural Block    Patient location during procedure: OB/L&D  Start time: 12/23/2024 9:34 PM  Reason for block: at surgeon's request  Staffing  Performed by: Brayan Larkin DO  Authorized by: Brayan Larkin DO    Preanesthetic Checklist  Completed: patient identified, IV checked, site marked, risks and benefits discussed, surgical consent, monitors and equipment checked, pre-op evaluation and timeout performed  Epidural  Patient position: sitting  Prep: Betadine  Sedation Level: no sedation  Patient monitoring: cardiac monitor, heart rate and frequent blood pressure checks  Approach: midline  Location: lumbar, L3-4  Injection technique: KATHY air  Needle  Needle type: Tuohy   Needle gauge: 18 G  Catheter type: side hole  Catheter size: 20 G  Catheter securement method: tape  Test dose: negativeropivacaine (NAROPIN) 0.2% injection 10 mL - Epidural   10 mL - 12/23/2024 9:35:00 PM  Assessment  Number of attempts: 1no paresthesia on injection, negative aspiration for heme and negative aspiration for CSF  patient tolerated the procedure well with no immediate complications

## 2024-12-24 NOTE — OB LABOR/OXYTOCIN SAFETY PROGRESS
Oxytocin Safety Progress Check Note - Julia Haines 19 y.o. female MRN: [id number]    Unit/Bed#: -01 Encounter: [id number]    Dose (hudson-units/min) Oxytocin: 10 hudson-units/min  Contraction Frequency (minutes): 2-3  Contraction Intensity: Mild/Moderate  Uterine Activity Characteristics: Irregular  Cervical Dilation: 10  Dilation Complete Date: 12/24/24  Dilation Complete Time: 0100  Cervical Effacement: 70  Fetal Station: -2  Baseline Rate (FHR): 165 bpm  Fetal Heart Rate (FHT): 162 BPM  FHR Category: 1               Vital Signs:   Vitals:    12/24/24 0030   BP: 143/94   Pulse: (!) 107   Resp:    Temp:    SpO2:        Patient called out with lots of pressure. SVE now complete. Plan to start pushing. Dr. Sharp aware.    Estefanía Pettit MD 12/24/2024 1:03 AM

## 2024-12-24 NOTE — OB LABOR/OXYTOCIN SAFETY PROGRESS
Oxytocin Safety Progress Check Note - Julia Haines 19 y.o. female MRN: [id number]    Unit/Bed#: -01 Encounter: [id number]    Dose (hudson-units/min) Oxytocin: 2 hudson-units/min  Contraction Frequency (minutes): 1.5-2  Contraction Intensity: Mild/Moderate  Uterine Activity Characteristics: Irregular  Cervical Dilation: 4-5        Cervical Effacement: 70  Fetal Station: -2  Baseline Rate (FHR): 155 bpm  Fetal Heart Rate (FHT): 162 BPM  FHR Category: 1               Vital Signs:   Vitals:    12/23/24 1846   BP: 141/88   Pulse: 101   Resp:    Temp:        Patient due for check. SVE with some change, as above. FHT cat 1, martin irregularly. Conitnue pitocin titration as tolerated.     Estefanía Pettit MD 12/23/2024 7:18 PM

## 2024-12-24 NOTE — PLAN OF CARE
Problem: PAIN - ADULT  Goal: Verbalizes/displays adequate comfort level or baseline comfort level  Description: Interventions:  - Encourage patient to monitor pain and request assistance  - Assess pain using appropriate pain scale  - Administer analgesics based on type and severity of pain and evaluate response  - Implement non-pharmacological measures as appropriate and evaluate response  - Consider cultural and social influences on pain and pain management  - Notify physician/advanced practitioner if interventions unsuccessful or patient reports new pain  Outcome: Progressing     Problem: INFECTION - ADULT  Goal: Absence or prevention of progression during hospitalization  Description: INTERVENTIONS:  - Assess and monitor for signs and symptoms of infection  - Monitor lab/diagnostic results  - Monitor all insertion sites, i.e. indwelling lines, tubes, and drains  - Monitor endotracheal if appropriate and nasal secretions for changes in amount and color  - [place] appropriate cooling/warming therapies per order  - Administer medications as ordered  - Instruct and encourage patient and family to use good hand hygiene technique  - Identify and instruct in appropriate isolation precautions for identified infection/condition  Outcome: Progressing  Goal: Absence of fever/infection during neutropenic period  Description: INTERVENTIONS:  - Monitor WBC    Outcome: Progressing     Problem: SAFETY ADULT  Goal: Patient will remain free of falls  Description: INTERVENTIONS:  - Educate patient/family on patient safety including physical limitations  - Instruct patient to call for assistance with activity   - Consult OT/PT to assist with strengthening/mobility   - Keep Call bell within reach  - Keep bed low and locked with side rails adjusted as appropriate  - Keep care items and personal belongings within reach  - Initiate and maintain comfort rounds  - Make Fall Risk Sign visible to staff  - Apply yellow socks and bracelet  for high fall risk patients  - Consider moving patient to room near nurses station  Outcome: Progressing  Goal: Maintain or return to baseline ADL function  Description: INTERVENTIONS:  -  Assess patient's ability to carry out ADLs; assess patient's baseline for ADL function and identify physical deficits which impact ability to perform ADLs (bathing, care of mouth/teeth, toileting, grooming, dressing, etc.)  - Assess/evaluate cause of self-care deficits   - Assess range of motion  - Assess patient's mobility; develop plan if impaired  - Assess patient's need for assistive devices and provide as appropriate  - Encourage maximum independence but intervene and supervise when necessary  - Involve family in performance of ADLs  - Assess for home care needs following discharge   - Consider OT consult to assist with ADL evaluation and planning for discharge  - Provide patient education as appropriate  Outcome: Progressing  Goal: Maintains/Returns to pre admission functional level  Description: INTERVENTIONS:  - Perform AM-PAC 6 Click Basic Mobility/ Daily Activity assessment daily.  - Set and communicate daily mobility goal to care team and patient/family/caregiver.   - Collaborate with rehabilitation services on mobility goals if consulted  - Out of bed for toileting  - Record patient progress and toleration of activity level   Outcome: Progressing     Problem: DISCHARGE PLANNING  Goal: Discharge to home or other facility with appropriate resources  Description: INTERVENTIONS:  - Identify barriers to discharge w/patient and caregiver  - Arrange for needed discharge resources and transportation as appropriate  - Identify discharge learning needs (meds, wound care, etc.)  - Arrange for interpretive services to assist at discharge as needed  - Refer to Case Management Department for coordinating discharge planning if the patient needs post-hospital services based on physician/advanced practitioner order or complex needs  related to functional status, cognitive ability, or social support system  Outcome: Progressing     Problem: Knowledge Deficit  Goal: Patient/family/caregiver demonstrates understanding of disease process, treatment plan, medications, and discharge instructions  Description: Complete learning assessment and assess knowledge base.  Interventions:  - Provide teaching at level of understanding  - Provide teaching via preferred learning methods  Outcome: Progressing     Problem: POSTPARTUM  Goal: Experiences normal postpartum course  Description: INTERVENTIONS:  - Monitor maternal vital signs  - Assess uterine involution and lochia  Outcome: Progressing  Goal: Appropriate maternal -  bonding  Description: INTERVENTIONS:  - Identify family support  - Assess for appropriate maternal/infant bonding   -Encourage maternal/infant bonding opportunities  - Referral to  or  as needed  Outcome: Progressing  Goal: Establishment of infant feeding pattern  Description: INTERVENTIONS:  - Assess breast/bottle feeding  - Refer to lactation as needed  Outcome: Progressing  Goal: Incision(s), wounds(s) or drain site(s) healing without S/S of infection  Description: INTERVENTIONS  - Assess and document dressing, incision, wound bed, drain sites and surrounding tissue  - Provide patient and family education  Outcome: Progressing

## 2024-12-25 VITALS
HEIGHT: 66 IN | OXYGEN SATURATION: 98 % | RESPIRATION RATE: 18 BRPM | BODY MASS INDEX: 32.95 KG/M2 | SYSTOLIC BLOOD PRESSURE: 133 MMHG | DIASTOLIC BLOOD PRESSURE: 90 MMHG | HEART RATE: 78 BPM | WEIGHT: 205 LBS | TEMPERATURE: 98.4 F

## 2024-12-25 LAB
ERYTHROCYTE [DISTWIDTH] IN BLOOD BY AUTOMATED COUNT: 13.9 % (ref 11.6–15.1)
HCT VFR BLD AUTO: 31.3 % (ref 34.8–46.1)
HGB BLD-MCNC: 10.2 G/DL (ref 11.5–15.4)
MCH RBC QN AUTO: 30.8 PG (ref 26.8–34.3)
MCHC RBC AUTO-ENTMCNC: 32.6 G/DL (ref 31.4–37.4)
MCV RBC AUTO: 95 FL (ref 82–98)
PLATELET # BLD AUTO: 128 THOUSANDS/UL (ref 149–390)
PMV BLD AUTO: 13.5 FL (ref 8.9–12.7)
RBC # BLD AUTO: 3.31 MILLION/UL (ref 3.81–5.12)
WBC # BLD AUTO: 14.19 THOUSAND/UL (ref 4.31–10.16)

## 2024-12-25 PROCEDURE — NC001 PR NO CHARGE: Performed by: STUDENT IN AN ORGANIZED HEALTH CARE EDUCATION/TRAINING PROGRAM

## 2024-12-25 PROCEDURE — 85027 COMPLETE CBC AUTOMATED: CPT

## 2024-12-25 PROCEDURE — 99024 POSTOP FOLLOW-UP VISIT: CPT | Performed by: STUDENT IN AN ORGANIZED HEALTH CARE EDUCATION/TRAINING PROGRAM

## 2024-12-25 PROCEDURE — 90707 MMR VACCINE SC: CPT

## 2024-12-25 RX ORDER — ACETAMINOPHEN 325 MG/1
650 TABLET ORAL EVERY 6 HOURS
Status: DISCONTINUED | OUTPATIENT
Start: 2024-12-25 | End: 2024-12-25 | Stop reason: HOSPADM

## 2024-12-25 RX ORDER — IBUPROFEN 600 MG/1
600 TABLET, FILM COATED ORAL EVERY 6 HOURS
Status: DISCONTINUED | OUTPATIENT
Start: 2024-12-25 | End: 2024-12-25 | Stop reason: HOSPADM

## 2024-12-25 RX ORDER — ACETAMINOPHEN 325 MG/1
650 TABLET ORAL EVERY 6 HOURS
Qty: 240 TABLET | Refills: 0 | Status: SHIPPED | OUTPATIENT
Start: 2024-12-25

## 2024-12-25 RX ORDER — BENZOCAINE/MENTHOL 6 MG-10 MG
1 LOZENGE MUCOUS MEMBRANE DAILY PRN
Start: 2024-12-25

## 2024-12-25 RX ORDER — ONDANSETRON 2 MG/ML
4 INJECTION INTRAMUSCULAR; INTRAVENOUS ONCE
Status: DISCONTINUED | OUTPATIENT
Start: 2024-12-25 | End: 2024-12-25

## 2024-12-25 RX ORDER — IBUPROFEN 600 MG/1
600 TABLET, FILM COATED ORAL EVERY 6 HOURS SCHEDULED
Status: CANCELLED
Start: 2024-12-25

## 2024-12-25 RX ORDER — IBUPROFEN 600 MG/1
600 TABLET, FILM COATED ORAL EVERY 6 HOURS
Qty: 30 TABLET | Refills: 0 | Status: SHIPPED | OUTPATIENT
Start: 2024-12-25

## 2024-12-25 RX ORDER — ACETAMINOPHEN 325 MG/1
650 TABLET ORAL EVERY 6 HOURS SCHEDULED
Status: CANCELLED
Start: 2024-12-25

## 2024-12-25 RX ORDER — ONDANSETRON 4 MG/1
4 TABLET, ORALLY DISINTEGRATING ORAL EVERY 6 HOURS PRN
Status: DISCONTINUED | OUTPATIENT
Start: 2024-12-25 | End: 2024-12-25 | Stop reason: HOSPADM

## 2024-12-25 RX ADMIN — IBUPROFEN 600 MG: 600 TABLET, FILM COATED ORAL at 18:22

## 2024-12-25 RX ADMIN — IBUPROFEN 600 MG: 600 TABLET, FILM COATED ORAL at 06:04

## 2024-12-25 RX ADMIN — AMPICILLIN SODIUM AND SULBACTAM SODIUM 3 G: 100; 50 INJECTION, POWDER, FOR SOLUTION INTRAVENOUS at 11:50

## 2024-12-25 RX ADMIN — ONDANSETRON 4 MG: 2 INJECTION INTRAMUSCULAR; INTRAVENOUS at 10:34

## 2024-12-25 RX ADMIN — AMPICILLIN SODIUM AND SULBACTAM SODIUM 3 G: 100; 50 INJECTION, POWDER, FOR SOLUTION INTRAVENOUS at 00:03

## 2024-12-25 RX ADMIN — IBUPROFEN 600 MG: 600 TABLET, FILM COATED ORAL at 11:49

## 2024-12-25 RX ADMIN — DOCUSATE SODIUM 100 MG: 100 CAPSULE, LIQUID FILLED ORAL at 08:51

## 2024-12-25 RX ADMIN — AMPICILLIN SODIUM AND SULBACTAM SODIUM 3 G: 100; 50 INJECTION, POWDER, FOR SOLUTION INTRAVENOUS at 06:09

## 2024-12-25 RX ADMIN — IBUPROFEN 600 MG: 600 TABLET, FILM COATED ORAL at 00:07

## 2024-12-25 RX ADMIN — MEASLES, MUMPS, AND RUBELLA VIRUS VACCINE LIVE 0.5 ML: 1000; 12500; 1000 INJECTION, POWDER, LYOPHILIZED, FOR SUSPENSION SUBCUTANEOUS at 06:05

## 2024-12-25 RX ADMIN — ACETAMINOPHEN 650 MG: 325 TABLET, FILM COATED ORAL at 03:11

## 2024-12-25 RX ADMIN — DOCUSATE SODIUM 100 MG: 100 CAPSULE, LIQUID FILLED ORAL at 18:22

## 2024-12-25 RX ADMIN — ACETAMINOPHEN 650 MG: 325 TABLET, FILM COATED ORAL at 15:32

## 2024-12-25 RX ADMIN — SENNOSIDES 8.6 MG: 8.6 TABLET, FILM COATED ORAL at 08:51

## 2024-12-25 RX ADMIN — ACETAMINOPHEN 650 MG: 325 TABLET, FILM COATED ORAL at 08:51

## 2024-12-25 NOTE — ASSESSMENT & PLAN NOTE
Admission CBC/CMP wnl, urine P:C 0.3   Pressures normal to mild range, not requiring medication at this time  PO antihypertensive PRN  Enroll in postpartum BP monitoring program

## 2024-12-25 NOTE — ASSESSMENT & PLAN NOTE
Continue routine post partum care  Pain well controlled: tylenol/motrin scheduled  Lochia within normal limits: continue to monitor   OOB: as able, encourage ambulation  Passing flatus  Voiding spontaneously  Breastfeeding  Baby in: room  Dispo: anticipate d/c home PPD1-2

## 2024-12-25 NOTE — PROGRESS NOTES
"Progress Note - OB/GYN   Name: Julia Haines 19 y.o. female I MRN: [id number]  Unit/Bed#: -01 I Date of Admission: 2024   Date of Service: 2024 I Hospital Day: 2     Assessment & Plan   (spontaneous vaginal delivery)  Continue routine post partum care  Pain well controlled: tylenol/motrin scheduled  Lochia within normal limits: continue to monitor   OOB: as able, encourage ambulation  Passing flatus  Voiding spontaneously  Breastfeeding  Baby in: room  Dispo: anticipate d/c home PPD1-2    Rubella non-immune status, antepartum  Offer vaccine postpartum  History of depression  F/u EPDS  Diet controlled gestational diabetes mellitus (GDM) in third trimester  No results found for: \"HGBA1C\"    Recent Labs     24  1721 24  1922 24  2104 24  2317   POCGLU 78 82 73 69       Blood Sugar Average: Last 72 hrs:  (P) 77.13324547522059219    Pre-eclampsia in third trimester  Admission CBC/CMP wnl, urine P:C 0.3   Pressures normal to mild range, not requiring medication at this time  PO antihypertensive PRN  Enroll in postpartum BP monitoring program    OB Post-Partum Progress Note  Subjective   Post delivery. Patient is doing well. Lochia WNL. Pain well controlled. She does report increased vaginal bleeding this morning: says she's been soaking through about 1 pad per hour. However, the pads she's been using are the ice packs. She denies any dizziness or pre-syncopal symptoms. She denies headaches, changes in vision, chest pain, difficulty breathing, RUQ pain. She would like to go home today if medically cleared.    Pain: yes, cramping, improved with meds  Tolerating PO: yes  Voiding: passing  Flatus: yes  BM: yes  Ambulating: yes  Breastfeeding:  yes  Chest pain: no  Shortness of breath: no  Leg pain: no  Lochia: WNL    Objective :  Temp:  [97.6 °F (36.4 °C)-98.7 °F (37.1 °C)] 97.6 °F (36.4 °C)  HR:  [] 93  BP: (129-148)/(81-99) 135/99  Resp:  [18] 18  SpO2:  [96 %-99 %] 96 " %  O2 Device: None (Room air)    Physical Exam  Constitutional:       Appearance: Normal appearance.   Cardiovascular:      Rate and Rhythm: Normal rate.   Pulmonary:      Effort: Pulmonary effort is normal.   Abdominal:      Palpations: Abdomen is soft.      Comments: Fundus firm, below umbilicus   Genitourinary:     Comments: No lochia, but pad had just recently been exchanged  Musculoskeletal:      Right lower leg: No edema.      Left lower leg: No edema.   Skin:     General: Skin is warm and dry.   Neurological:      General: No focal deficit present.      Mental Status: She is alert and oriented to person, place, and time.   Psychiatric:         Mood and Affect: Mood normal.         Behavior: Behavior normal.           Lab Results: I have reviewed the following results:  Lab Results   Component Value Date    WBC 11.87 (H) 12/23/2024    HGB 12.0 12/23/2024    HCT 36.3 12/23/2024    MCV 92 12/23/2024     12/23/2024     Estefanía Pettit MD  OBGYN PGY2

## 2024-12-25 NOTE — ASSESSMENT & PLAN NOTE
"No results found for: \"HGBA1C\"    Recent Labs     12/23/24  1721 12/23/24  1922 12/23/24  2104 12/23/24  2317   POCGLU 78 82 73 69       Blood Sugar Average: Last 72 hrs:  (P) [id number]    "

## 2024-12-25 NOTE — PLAN OF CARE
Problem: PAIN - ADULT  Goal: Verbalizes/displays adequate comfort level or baseline comfort level  Description: Interventions:  - Encourage patient to monitor pain and request assistance  - Assess pain using appropriate pain scale  - Administer analgesics based on type and severity of pain and evaluate response  - Implement non-pharmacological measures as appropriate and evaluate response  - Consider cultural and social influences on pain and pain management  - Notify physician/advanced practitioner if interventions unsuccessful or patient reports new pain  Outcome: Progressing     Problem: INFECTION - ADULT  Goal: Absence or prevention of progression during hospitalization  Description: INTERVENTIONS:  - Assess and monitor for signs and symptoms of infection  - Monitor lab/diagnostic results  - Monitor all insertion sites, i.e. indwelling lines, tubes, and drains  - Monitor endotracheal if appropriate and nasal secretions for changes in amount and color  - [place] appropriate cooling/warming therapies per order  - Administer medications as ordered  - Instruct and encourage patient and family to use good hand hygiene technique  - Identify and instruct in appropriate isolation precautions for identified infection/condition  Outcome: Progressing  Goal: Absence of fever/infection during neutropenic period  Description: INTERVENTIONS:  - Monitor WBC    Outcome: Progressing     Problem: SAFETY ADULT  Goal: Patient will remain free of falls  Description: INTERVENTIONS:  - Educate patient/family on patient safety including physical limitations  - Instruct patient to call for assistance with activity   - Consult OT/PT to assist with strengthening/mobility   - Keep Call bell within reach  - Keep bed low and locked with side rails adjusted as appropriate  - Keep care items and personal belongings within reach  - Initiate and maintain comfort rounds    Outcome: Progressing  Goal: Maintain or return to baseline ADL  function  Description: INTERVENTIONS:  -  Assess patient's ability to carry out ADLs; assess patient's baseline for ADL function and identify physical deficits which impact ability to perform ADLs (bathing, care of mouth/teeth, toileting, grooming, dressing, etc.)  - Assess/evaluate cause of self-care deficits   - Assess range of motion  - Assess patient's mobility; develop plan if impaired  - Assess patient's need for assistive devices and provide as appropriate  - Encourage maximum independence but intervene and supervise when necessary  - Involve family in performance of ADLs  - Assess for home care needs following discharge   - Consider OT consult to assist with ADL evaluation and planning for discharge  - Provide patient education as appropriate  Outcome: Progressing       Problem: DISCHARGE PLANNING  Goal: Discharge to home or other facility with appropriate resources  Description: INTERVENTIONS:  - Identify barriers to discharge w/patient and caregiver  - Arrange for needed discharge resources and transportation as appropriate  - Identify discharge learning needs (meds, wound care, etc.)  - Arrange for interpretive services to assist at discharge as needed  - Refer to Case Management Department for coordinating discharge planning if the patient needs post-hospital services based on physician/advanced practitioner order or complex needs related to functional status, cognitive ability, or social support system  Outcome: Progressing     Problem: Knowledge Deficit  Goal: Patient/family/caregiver demonstrates understanding of disease process, treatment plan, medications, and discharge instructions  Description: Complete learning assessment and assess knowledge base.  Interventions:  - Provide teaching at level of understanding  - Provide teaching via preferred learning methods  Outcome: Progressing     Problem: POSTPARTUM  Goal: Experiences normal postpartum course  Description: INTERVENTIONS:  - Monitor maternal  vital signs  - Assess uterine involution and lochia  Outcome: Progressing  Goal: Appropriate maternal -  bonding  Description: INTERVENTIONS:  - Identify family support  - Assess for appropriate maternal/infant bonding   -Encourage maternal/infant bonding opportunities  - Referral to  or  as needed  Outcome: Progressing  Goal: Establishment of infant feeding pattern  Description: INTERVENTIONS:  - Assess breast/bottle feeding  - Refer to lactation as needed  Outcome: Progressing

## 2024-12-25 NOTE — PLAN OF CARE

## 2024-12-25 NOTE — PROGRESS NOTES
"Discharge education reviewed with patient and spouse at bedside. Educational packet and \"save your life\" magnet provided. Questions encouraged and answered by RN. Patient and spouse verbalized understanding.   "

## 2024-12-30 ENCOUNTER — TELEPHONE (OUTPATIENT)
Dept: OBGYN CLINIC | Facility: CLINIC | Age: 19
End: 2024-12-30

## 2025-01-01 LAB — PLACENTA IN STORAGE: NORMAL

## 2025-01-02 ENCOUNTER — TELEPHONE (OUTPATIENT)
Dept: OBGYN CLINIC | Facility: CLINIC | Age: 20
End: 2025-01-02

## 2025-01-02 ENCOUNTER — ROUTINE PRENATAL (OUTPATIENT)
Dept: OBGYN CLINIC | Facility: CLINIC | Age: 20
End: 2025-01-02

## 2025-01-02 VITALS
HEART RATE: 88 BPM | HEIGHT: 66 IN | SYSTOLIC BLOOD PRESSURE: 126 MMHG | WEIGHT: 184.4 LBS | BODY MASS INDEX: 29.63 KG/M2 | OXYGEN SATURATION: 98 % | DIASTOLIC BLOOD PRESSURE: 90 MMHG

## 2025-01-02 DIAGNOSIS — O14.93 PRE-ECLAMPSIA IN THIRD TRIMESTER: Primary | ICD-10-CM

## 2025-01-02 PROCEDURE — 99024 POSTOP FOLLOW-UP VISIT: CPT | Performed by: OBSTETRICS & GYNECOLOGY

## 2025-01-02 NOTE — PROGRESS NOTES
"Subjective:     Julia Haines is a 19 y.o.  female who presents for post partum blood pressure check. She was diagnosed with Pre-eclampsia postpartum and was discharged home without any medications. She denies symptoms of Pre-eclampsia including headaches, vision changes, RUQ pain and extremity swelling. She reports her bleeding is minimal and she has some pelvic pain that has been managed with tylenol and motrin. She is breast and bottle feeding with similac.  She has not been able to take her BP at home.    Objective:    Vitals: Blood pressure 126/90, pulse 88, height 5' 6\" (1.676 m), weight 83.6 kg (184 lb 6.4 oz), last menstrual period 2024, SpO2 98%, currently breastfeeding.Body mass index is 29.76 kg/m².    Physical Exam  Constitutional:       Appearance: She is well-developed.   Cardiovascular:      Rate and Rhythm: Normal rate and regular rhythm.      Heart sounds: Normal heart sounds. No murmur heard.     No friction rub. No gallop.   Pulmonary:      Effort: Pulmonary effort is normal. No respiratory distress.      Breath sounds: No wheezing.   Abdominal:      Palpations: Abdomen is soft.      Tenderness: There is no abdominal tenderness.   Musculoskeletal:         General: No tenderness.   Neurological:      Mental Status: She is alert and oriented to person, place, and time.   Vitals reviewed.         Assessment/Plan:    Problem List Items Addressed This Visit       Pre-eclampsia in third trimester - Primary    Continue to monitor BP's  Patient aware of symptoms to look out for          (spontaneous vaginal delivery)    Doing well  RTO in 2 weeks for full postpartum visit              Tim Sharp MD  2025  12:10 PM        "

## 2025-01-02 NOTE — TELEPHONE ENCOUNTER
POSTPARTUM PHONE CALL ASSESSMENT    Date of Delivery: 2024  Delivering Provider: Dr. Sharp  Mode:   Delivery Notes/Complications: No complications    Do you still have bleeding/pain? Yes, light bleeding and complaining of mild pelvic pain managed with Tylenol.   Regular BMs/Urination? Yes  Breastfeeding/Formula/Both? Breast and bottle feeding  How are you doing emotionally? Patient states she is doing well.     Do you have any other questions or concerns for us or your provider? No     Have you scheduled the pediatrician appointment with pediatrician? Yes    Do you have a postpartum visit scheduled? Yes   Date scheduled: 2025 Provider:  Rachel Pichardo

## 2025-01-07 DIAGNOSIS — Z86.32 HISTORY OF GESTATIONAL DIABETES MELLITUS (GDM), NOT CURRENTLY PREGNANT: ICD-10-CM

## 2025-01-07 DIAGNOSIS — Z13.1 DIABETES MELLITUS SCREENING: Primary | ICD-10-CM

## 2025-01-07 NOTE — PROGRESS NOTES
Today's Date: 1/7/2025  Pt's Preferred Lab: [facility] ([facility])  [address] [facility] [address]  Phone: [phone]  Fax: [phone]  Ambulatory Order    Lab Ordered: 2hr (75GM) GTT   Reason: to screen for T2DM s/p delivery r/t H/O GDM  Time-Frame to be collected: 4-12 weeks postpartum    Patient Instructions: Fasting = Do NOT eat or drink anything except WATER for at least 8 hours before the test.    *For [facility], please call [phone] to schedule.   *To request lab be sent to alternative lab including [facility] or [facility], Call: [phone] or Message Diabetes Team via [employer] Message to CAMERON Byrne RD   Diabetes Educator   [facility] - Maternal Fetal Medicine  Diabetes and Pregnancy Program

## 2025-01-13 ENCOUNTER — POSTPARTUM VISIT (OUTPATIENT)
Dept: OBGYN CLINIC | Facility: CLINIC | Age: 20
End: 2025-01-13
Payer: COMMERCIAL

## 2025-01-13 VITALS
OXYGEN SATURATION: 98 % | DIASTOLIC BLOOD PRESSURE: 82 MMHG | WEIGHT: 181 LBS | SYSTOLIC BLOOD PRESSURE: 108 MMHG | BODY MASS INDEX: 29.09 KG/M2 | HEIGHT: 66 IN

## 2025-01-13 DIAGNOSIS — Z30.011 ORAL CONTRACEPTIVE PRESCRIBED: ICD-10-CM

## 2025-01-13 PROBLEM — O24.410 DIET CONTROLLED GESTATIONAL DIABETES MELLITUS (GDM) IN THIRD TRIMESTER: Status: RESOLVED | Noted: 2024-10-16 | Resolved: 2025-01-13

## 2025-01-13 PROBLEM — O14.93 PRE-ECLAMPSIA IN THIRD TRIMESTER: Status: RESOLVED | Noted: 2024-12-23 | Resolved: 2025-01-13

## 2025-01-13 PROCEDURE — 99459 PELVIC EXAMINATION: CPT | Performed by: NURSE PRACTITIONER

## 2025-01-13 PROCEDURE — 99024 POSTOP FOLLOW-UP VISIT: CPT | Performed by: NURSE PRACTITIONER

## 2025-01-13 RX ORDER — ACETAMINOPHEN AND CODEINE PHOSPHATE 120; 12 MG/5ML; MG/5ML
1 SOLUTION ORAL DAILY
Qty: 84 TABLET | Refills: 3 | Status: SHIPPED | OUTPATIENT
Start: 2025-01-13

## 2025-01-13 NOTE — PROGRESS NOTES
"Assessment / Plan    Assessment & Plan  Postpartum exam  Normal check.  Advised to abstain from intercourse for 3 more weeks.       Oral contraceptive prescribed  Rx sent for  progesterone only pill.  RV 3 mo for yearly    Orders:    norethindrone (Ortho Micronor) 0.35 MG tablet; Take 1 tablet (0.35 mg total) by mouth daily        Subjective   PROBLEM VISIT     Julia Haines is a 19 y.o. female     POSTPARTUM  18 yo  presents 3 weeks post delivery for postpartum exam.    Pregnancy complicated by GDMA1 and pre-eclampsia w/o severe features, in 3rd trimester  Induced 24, 38w1d, 7lbs 4oz, male; periurethral laceration.  Breast feeding: breast and bottle feeding.  Going well.  Urination: no problems  Bowels: hurts when passes, ? Hemorrhoid; taking stool softener  Bleeding: none  Sexually active: no  Contraception: desires pill  PPD score 5    Pertinent history:  GDMA1- has order for 2 hr GTT  Pre-eclampsia- normal BP      Menstrual History:  OB History          1    Para   1    Term   1       0    AB   0    Living   1         SAB   0    IAB   0    Ectopic   0    Multiple   0    Live Births   1           Obstetric Comments   Menarche: 10  Cycles historically irregular, bled for months at a time.  Started OCP at age 12.  Discontinued 2023, now skipping cycles.  Gardasil completed                Patient's last menstrual period was 2024 (exact date).       The following portions of the patient's history were reviewed and updated as appropriate: allergies, current medications, past family history, past medical history, past social history, past surgical history, and problem list.    Review of Systems      Review of Systems   Constitutional:  Negative for chills and fever.   Eyes:  Negative for pain.   Gastrointestinal: Negative.    Genitourinary: Negative.        Objective     Visit Vitals  /82 (BP Location: Left arm, Patient Position: Sitting, Cuff Size: Standard)   Ht 5' 6\" " "(1.676 m)   Wt 82.1 kg (181 lb)   LMP 03/21/2024 (Exact Date)   SpO2 98%   Breastfeeding Yes   BMI 29.21 kg/m²   OB Status Recent pregnancy   Smoking Status Never   BSA 1.92 m²      *patient agrees to exam without a chaperone present.     /82 (BP Location: Left arm, Patient Position: Sitting, Cuff Size: Standard)   Ht 5' 6\" (1.676 m)   Wt 82.1 kg (181 lb)   LMP 03/21/2024 (Exact Date)   SpO2 98%   Breastfeeding Yes   BMI 29.21 kg/m²   Physical Exam  Constitutional:       General: She is not in acute distress.     Appearance: Normal appearance. She is well-developed. She is not ill-appearing or diaphoretic.      Comments: Body mass index is 29.21 kg/m².     HENT:      Head: Normocephalic and atraumatic.   Eyes:      Pupils: Pupils are equal, round, and reactive to light.   Pulmonary:      Effort: Pulmonary effort is normal.   Abdominal:      General: Abdomen is flat.      Palpations: Abdomen is soft.   Genitourinary:     General: Normal vulva.      Exam position: Lithotomy position.      Labia:         Right: No rash, tenderness, lesion or injury.         Left: No rash, tenderness, lesion or injury.       Vagina: No signs of injury and foreign body. No vaginal discharge, erythema, tenderness or bleeding.      Cervix: No cervical motion tenderness, discharge or friability.      Uterus: Not enlarged and not tender.       Adnexa:         Right: No mass or tenderness.          Left: No mass or tenderness.     Skin:     General: Skin is warm and dry.   Neurological:      General: No focal deficit present.      Mental Status: She is alert and oriented to person, place, and time.   Psychiatric:         Mood and Affect: Mood normal.         Behavior: Behavior normal.         Thought Content: Thought content normal.         Judgment: Judgment normal.                 "

## 2025-01-22 ENCOUNTER — APPOINTMENT (OUTPATIENT)
Dept: LAB | Facility: HOSPITAL | Age: 20
End: 2025-01-22
Payer: COMMERCIAL

## 2025-01-22 ENCOUNTER — RESULTS FOLLOW-UP (OUTPATIENT)
Facility: HOSPITAL | Age: 20
End: 2025-01-22

## 2025-01-22 DIAGNOSIS — Z86.32 HISTORY OF GESTATIONAL DIABETES MELLITUS (GDM), NOT CURRENTLY PREGNANT: ICD-10-CM

## 2025-01-22 DIAGNOSIS — Z13.1 DIABETES MELLITUS SCREENING: ICD-10-CM

## 2025-01-22 LAB
GLUCOSE 2H P 75 G GLC PO SERPL-MCNC: 115 MG/DL (ref 70–139)
GLUCOSE P FAST SERPL-MCNC: 92 MG/DL (ref 65–99)

## 2025-01-22 PROCEDURE — 82947 ASSAY GLUCOSE BLOOD QUANT: CPT

## 2025-01-22 PROCEDURE — 82950 GLUCOSE TEST: CPT

## 2025-01-22 PROCEDURE — 36415 COLL VENOUS BLD VENIPUNCTURE: CPT

## 2025-02-10 ENCOUNTER — OFFICE VISIT (OUTPATIENT)
Dept: OBGYN CLINIC | Facility: CLINIC | Age: 20
End: 2025-02-10
Payer: COMMERCIAL

## 2025-02-10 VITALS — DIASTOLIC BLOOD PRESSURE: 78 MMHG | SYSTOLIC BLOOD PRESSURE: 112 MMHG | BODY MASS INDEX: 29.21 KG/M2 | HEIGHT: 66 IN

## 2025-02-10 DIAGNOSIS — Z30.011 ORAL CONTRACEPTIVE PRESCRIBED: ICD-10-CM

## 2025-02-10 PROCEDURE — 99213 OFFICE O/P EST LOW 20 MIN: CPT | Performed by: NURSE PRACTITIONER

## 2025-02-10 RX ORDER — NORETHINDRONE 0.35 MG/1
1 TABLET ORAL DAILY
Qty: 84 TABLET | Refills: 1 | Status: SHIPPED | OUTPATIENT
Start: 2025-02-10

## 2025-02-10 NOTE — PROGRESS NOTES
"  7 weeks postpartum.  Seen for PP exam on 1/13/25 at which time she was doing well.  PPD score at that time was 5.    Presents today with complaint of \"some anxiety and I'm a little depressed, nothing major\".  Had gotten her first period, started  progesterone only pill, ceased breast feeding and partner went back to work, therefore \"a lot was going on.\"      Past medical hx of anxiety/depression, SI; believes she was medicated for same at about age 8.  Stopped when she was around age [age] and has not required treatment since.    Very exhausted.  Lives with her boyfriend and his mother who is very helpful.  She is resting while baby sleeps, at night is up Q2-3 hours to feed.  "

## 2025-02-10 NOTE — PROGRESS NOTES
"Assessment / Plan    Assessment & Plan  Postpartum depression  Discussed normalcy of her feelings and major adjustments which occur during the postpartum period.  Encouraged her to utilize support of her partner's mother until she is feeling better and to rest as much as possible.  Reassured her that we are here to provide prescription management if needed and that I will refer her to [facility] for counseling.  If her symptoms worsen or if BH feels she requires medication I will prescribe zoloft until she is able to follow up with her PCP.    Orders:    Ambulatory Referral to Psych Services; Future    RV for yearly or sooner as needed.      Subjective   PROBLEM VISIT     Julia Haines is a 19 y.o. female   CC:  post-partum depression/anxiety    7 weeks postpartum.  Seen for PP exam on 25 at which time she was doing well.  PPD score at that time was 5.    Presents today with complaint of \"some anxiety and I'm a little depressed, nothing major\", over the past week.  Reports that she had gotten her first period, started  progesterone only pill, ceased breast feeding and her partner went back to work, therefore \"a lot was going on.\"  Very exhausted.  Lives with her boyfriend and his mother who is very helpful, but she feels like she shouldn't be burdening her.  She is resting while baby sleeps, at night is up Q2-3 hours to bottle feed.    Past medical hx of anxiety/depression, SI; believes she was medicated for same at about age 8.  Stopped when she was around age [age] and has not required treatment since.  PPD score today is 10.  She is teary.  Denies homicidal, suicidal ideation or neglect of baby.      Menstrual History:  OB History          1    Para   1    Term   1       0    AB   0    Living   1         SAB   0    IAB   0    Ectopic   0    Multiple   0    Live Births   1           Obstetric Comments   Menarche: 10  Cycles historically irregular, bled for months at a time.  Started OCP at " "age 12.  Discontinued 6/2023, now skipping cycles.  Gardasil completed                Patient's last menstrual period was 02/03/2025 (exact date).       The following portions of the patient's history were reviewed and updated as appropriate: allergies, current medications, past family history, past medical history, past social history, past surgical history, and problem list.    Review of Systems      Review of Systems   Constitutional:  Negative for chills and fever.   Psychiatric/Behavioral:  Positive for dysphoric mood and sleep disturbance. Negative for self-injury and suicidal ideas. The patient is nervous/anxious.        Objective     Visit Vitals  /78 (BP Location: Left arm, Patient Position: Sitting, Cuff Size: Standard)   Ht 5' 6\" (1.676 m)   LMP 02/03/2025 (Exact Date)   Breastfeeding No   BMI 29.21 kg/m²   OB Status Recent pregnancy   Smoking Status Never   BSA 1.92 m²      Chaperone present: n/a     /78 (BP Location: Left arm, Patient Position: Sitting, Cuff Size: Standard)   Ht 5' 6\" (1.676 m)   LMP 02/03/2025 (Exact Date)   Breastfeeding No   BMI 29.21 kg/m²   Physical Exam  Constitutional:       General: She is not in acute distress.     Appearance: Normal appearance. She is not ill-appearing or diaphoretic.      Comments: Appears tired; at times teary   HENT:      Head: Normocephalic and atraumatic.   Eyes:      Pupils: Pupils are equal, round, and reactive to light.   Pulmonary:      Effort: Pulmonary effort is normal.   Skin:     General: Skin is warm and dry.   Neurological:      General: No focal deficit present.      Mental Status: She is alert and oriented to person, place, and time.   Psychiatric:         Mood and Affect: Mood normal.         Behavior: Behavior normal.         Thought Content: Thought content normal.         Judgment: Judgment normal.                   "

## 2025-02-17 ENCOUNTER — TELEMEDICINE (OUTPATIENT)
Dept: BEHAVIORAL/MENTAL HEALTH CLINIC | Facility: CLINIC | Age: 20
End: 2025-02-17
Payer: COMMERCIAL

## 2025-02-17 PROCEDURE — 90791 PSYCH DIAGNOSTIC EVALUATION: CPT | Performed by: COUNSELOR

## 2025-02-17 NOTE — PSYCH
Virtual AWV Consent    Reason for visit is initial evaluation assessment    Provider located at DIALYSIS CENTER  [facility]  [address]  [place] [address]    Recent Visits  No visits were found meeting these conditions.  Showing recent visits within past 7 days and meeting all other requirements  Future Appointments  No visits were found meeting these conditions.  Showing future appointments within next 150 days and meeting all other requirements       Administrative Statements   Encounter provider Adebayo Alberts LPC    The Patient is located at Home and in the following state in which I hold an active license [place].    The patient was identified by name and date of birth. Julia Haines was informed that this is a telemedicine visit and that the visit is being conducted through the Epic Embedded platform. She agrees to proceed..  My office door was closed. No one else was in the room.  She acknowledged consent and understanding of privacy and security of the video platform. The patient has agreed to participate and understands they can discontinue the visit at any time.    I have spent a total time of 54  minutes in caring for this patient on the day of the visit/encounter including  initial intake assessment .     Behavioral Health Psychotherapy Assessment    Date of Initial Psychotherapy Assessment: 02/17/25  Referral Source: OBGYN  Has a release of information been signed for the referral source? No    Preferred Name: Julia Haines  Preferred Pronouns: She/her  YOB: 2005 Age: 19 y.o.  Sex assigned at birth: female   Gender Identity: Female  Race:   Preferred Language: English    Emergency Contact:  Full Name: Amaury  Relationship to Client: partner, FOB  Contact information: in cell phone, home with her     Primary Care Physician:  [facility] [place] [id number] [phone]  Has a release of information been  "signed? No    Physical Health History:  Past surgical procedures: See chart   Do you have a history of any of the following: none   Do you have any mobility issues? No    Relevant Family History:  There is a noted family history of ADHD, completed suicide, ASD, Anxiety and Depression    Presenting Problem (What brings you in?)  Ivonne reports that she has been struggling with anxiety since the birth of her child. She reports some panic features as well with this. She struggles with avoidance as a result. She has a history of ADHD and when she started HS she stopped her ADHD medication. She reports a history of \"tough times\" and that she was hospitalized as a result in her senior year.     Mental Health Advance Directive:  Do you currently have a Mental Health Advance Directive?nod    Diagnosis:   Diagnosis ICD-10-CM Associated Orders   1. Postpartum depression  F53.0 Ambulatory Referral to Psych Services          Initial Assessment:     Current Mental Status:    Appearance: appropriate and neat      Behavior/Manner: cooperative      Affect/Mood:  Euthymic    Speech:  Normal    Sleep:  Normal    Oriented to: oriented to self, oriented to place and oriented to time       Clinical Symptoms    Depression: yes      Anxiety: yes      Depression Symptoms: depressed mood, suicidal ideation and irritable      Anxiety Symptoms: excessive worry and irritable      Have you ever been assaultive to others or the environment: No      Have you ever been self-injurious: No      Counseling History:  Previous Counseling or Treatment  (Mental Health or Drug & Alcohol): No    Have you previously taken psychiatric medications: No      Suicide Risk Assessment  Have you ever had a suicide attempt: Yes    Have you had incidents of suicidal ideation: No    Are you currently experiencing suicidal thoughts: No    Additional Suicide Risk Information:  Ivonne reports that she was at the ED during her senior year of HS following seeing her " ex-boyfriend in the frey at school and wanting to harm herself; she was discharged from the ED.    Substance Abuse/Addiction Assessment:  Alcohol: No    Heroin: No    Fentanyl: No    Opiates: No    Cocaine: No    Amphetamines: No    Hallucinogens: No    Club Drugs: No    Benzodiazepines: No    Other Rx Meds: No    Marijuana: No    Tobacco/Nicotine: No    Have you experienced blackouts as a result of substance use: No    Have you had any periods of abstinence: No    Have you experienced symptoms of withdrawal: No    Have you ever overdosed on any substances?: No    Are you currently using any Medication Assisted Treatment for Substance Use: No      Compulsive Behaviors:  Compulsive Behavior Information:  Denies     Disordered Eating History:  Do you have a history of disordered eating: No      Social Determinants of Health:    SDOH:  Financial instability and stress    Legal History:    Have you ever been arrested  or had a DUI: No      Have you been incarcerated: No      Are you currently on parole/probation: No      Any current Children and Youth involvement: No      Any pending legal charges: No      Relationship History:    Current marital status: single      Natural Supports:  Mother, father and siblings    Relationship History:  -Arian, [employer]/boyfriend, live together; been together almost 2 years (met through a mutual connection). 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

## 2025-02-24 ENCOUNTER — TELEMEDICINE (OUTPATIENT)
Dept: BEHAVIORAL/MENTAL HEALTH CLINIC | Facility: CLINIC | Age: 20
End: 2025-02-24
Payer: COMMERCIAL

## 2025-02-24 PROCEDURE — 90832 PSYTX W PT 30 MINUTES: CPT | Performed by: COUNSELOR

## 2025-02-24 NOTE — BH TREATMENT PLAN
Outpatient Behavioral Health Psychotherapy Treatment Plan    Julia Haines  2005     Date of Initial Psychotherapy Assessment: February 17, 2025   Date of Current Treatment Plan: 02/24/25  Treatment Plan Target Date: August 1, 2025  Treatment Plan Expiration Date: August 1, 2025    Diagnosis:   No diagnosis found.    Area(s) of Need: Anxiety Management    Long Term Goal 1 (in the client's own words): Julia wants to work on feeling like she can go out on her own, by herself, with her son.     Stage of Change: Action    Target Date for completion: August 1, 2025     Anticipated therapeutic modalities: CBT, supportive psychotherapy      People identified to complete this goal: suad Dumont       Objective 1: (identify the means of measuring success in meeting the objective): Julia wants to work on learning her triggers with her son and feeling overwhelmed when going out places.       Objective 2: (identify the means of measuring success in meeting the objective): Julia will work on learning strategies to help her with managing her mood and anxiety.       Long Term Goal 2 (in the client's own words): Postpartum Support     Stage of Change: Action    Target Date for completion: August 1, 2025     Anticipated therapeutic modalities: CBT, supportive psychotherapy      People identified to complete this goal: suad Dumont       Objective 1: (identify the means of measuring success in meeting the objective): Julia will attend routine psychotherapy and attend the postpartum mothers' education and support group.       Objective 2: (identify the means of measuring success in meeting the objective): Julia is looking for general postpartum support.        I am currently under the care of a [facility]'s psychiatric provider: no    My [facility] psychiatric provider is: None noted    I am currently taking psychiatric medications: No    I feel that I will be ready for discharge from mental health care  when I reach the following (measurable goal/objective): Julia will be assessed in 6 months for discharge    For children and adults who have a legal guardian:   Has there been any change to custody orders and/or guardianship status? NA. If yes, attach updated documentation.    I have created my Crisis Plan and have been offered a copy of this plan    Behavioral Health Treatment Plan  Luke: Diagnosis and Treatment Plan explained to Julia Haines acknowledges an understanding of their diagnosis. Julia Haines agrees to this treatment plan.    I have been offered a copy of this Treatment Plan. yes

## 2025-02-24 NOTE — PSYCH
Virtual AWV Consent    Reason for visit is therapy follow up     Provider located at DIALYSIS CENTER  [facility]  [address]  [place] [address]    Recent Visits  Date Type Provider Dept   02/17/25 Telemedicine Adebayo Pugh LPC [facility]   Showing recent visits within past 7 days and meeting all other requirements  Today's Visits  Date Type Provider Dept   02/24/25 Telemedicine Adebayo Pugh LPC [facility]   Showing today's visits and meeting all other requirements  Future Appointments  No visits were found meeting these conditions.  Showing future appointments within next 150 days and meeting all other requirements       Administrative Statements   Encounter provider Adebayo Alberts LPC    The Patient is located at Home and in the following state in which I hold an active license [place].    The patient was identified by name and date of birth. Julia Haines was informed that this is a telemedicine visit and that the visit is being conducted through the Epic Embedded platform. She agrees to proceed..  My office door was closed. No one else was in the room.  She acknowledged consent and understanding of privacy and security of the video platform. The patient has agreed to participate and understands they can discontinue the visit at any time.    I have spent a total time of 28  minutes in caring for this patient on the day of the visit/encounter including Counseling / Coordination of care    Behavioral Health Psychotherapy Progress Note    Psychotherapy Provided: Individual Psychotherapy     1. Postpartum depression            Goals addressed in session: Goal 1 and Goal 2     DATA: Julia presented virtually in session and confirmed that she is at home. The session was focused on review of her mood and anxiety. Overall, she is doing well with managing both. She is finding time for herself, getting out and communicating her needs.  "The treatment plan and safety plan were created in session. Julia will continue to meet weekly for support.    During this session, this clinician used the following therapeutic modalities: Cognitive Behavioral Therapy and Supportive Psychotherapy    Substance Abuse was addressed during this session. If the client is diagnosed with a co-occurring substance use disorder, please indicate any changes in the frequency or amount of use: Julia does not use substances. Stage of change for addressing substance use diagnoses: No substance use/Not applicable    ASSESSMENT:  Julia Haines presents with a Euthymic/ normal mood.     her affect is Normal range and intensity, which is congruent, with her mood and the content of the session. The client has made progress on their goals.    Julia was engaged in session. Julia Haines presents with a none risk of suicide, none risk of self-harm, and none risk of harm to others.    For any risk assessment that surpasses a \"low\" rating, a safety plan must be developed.    A safety plan was indicated: no  If yes, describe in detail na    PLAN: Between sessions, Julia Haines will continue to work on managing her mood and anxiety. At the next session, the therapist will use Cognitive Behavioral Therapy and Supportive Psychotherapy to address mood and anxiety.    Behavioral Health Treatment Plan and Discharge Planning: Julia Haines is aware of and agrees to continue to work on their treatment plan. They have identified and are working toward their discharge goals. yes    Depression Follow-up Plan Completed: No    Visit start and stop times:    02/24/25  Start Time: 1106  Stop Time: 1134  Total Visit Time: 28 minutes.    "

## 2025-02-24 NOTE — BH CRISIS PLAN
Client Name: Julia Haines       Client YOB: 2005    [employer] Safety Plan      Creation Date: 2/24/25 Update Date: 2/24/26   Created By: Adebayo Pugh LPC Last Updated By: Adebayo Pugh LPC      Step 1: Warning Signs:   Warning Signs   Pacing back and forth   Forgetting to breath, feeling overwhelmed            Step 2: Internal Coping Strategies:   Internal Coping Strategies   Sit down and take a breath   Few minutes to self            Step 3: People and social settings that provide distraction:   Name Contact Information   Amaury in cell phone/home   MIL in cell phone/home   MOther in cell phone/home   Step-father in cell phone   Grandmother in cell phone    Places   Home           Step 4: People whom I can ask for help during a crisis:      Name Contact Information    Amaury in cell phone/home    Mother in cell phone    MIL in cell phone/home    Step-father in cell phone    Grandmother in cell phone      Step 5: Professionals or agencies I can contact during a crisis:      Clinican/Agency Name Phone Emergency Contact    [facility] [place] [phone]       [facility] Emergency Department Emergency Department Phone Emergency Department Address    [facility]          Crisis Phone Numbers:   Suicide Prevention Lifeline: Call or Text  635 Crisis Text Line: Text HOME to 283-324   Please note: Some [facility] do not have a separate number for Child/Adolescent specific crisis. If your county is not listed under Child/Adolescent, please call the adult number for your county      Adult Crisis Numbers: Child/Adolescent Crisis Numbers   [facility]: [phone] [facility]: [phone]   [facility]: [phone] [facility]: [phone]   [facility]: [phone] [place]: [phone]   [facility]: [phone] [place]: [phone]   [place] [facility]: [phone]   [facility]: [phone]   [facility]:  [phone]   [facility]: [phone] (daytime) [phone] (after hours, weekends, holidays)      Step 6: Making the environment safer (plan for lethal means safety):   Patient did not identify any lethal methods: Yes     Optional: What is most important to me and worth living for?   Julia reports that success and her family are most important to her, as well as happiness.      [employer] Safety Plan. Juliana Shepherd and [employer]. Used with permission of the authors.

## 2025-03-03 ENCOUNTER — TELEMEDICINE (OUTPATIENT)
Dept: BEHAVIORAL/MENTAL HEALTH CLINIC | Facility: CLINIC | Age: 20
End: 2025-03-03
Payer: COMMERCIAL

## 2025-03-03 PROCEDURE — 90832 PSYTX W PT 30 MINUTES: CPT | Performed by: COUNSELOR

## 2025-03-04 NOTE — PSYCH
"Virtual AWV Consent    Reason for visit is therapy follow up     Provider located at DIALYSIS CENTER  [facility]  [address]  [place] [address]    Recent Visits  Date Type Provider Dept   03/03/25 Telemedicine Adebayo Pugh LPC [facility]   Showing recent visits within past 7 days and meeting all other requirements  Future Appointments  No visits were found meeting these conditions.  Showing future appointments within next 150 days and meeting all other requirements       Administrative Statements   Encounter provider Adebayo Alberts LPC    The Patient is located at Home and in the following state in which I hold an active license [place].    The patient was identified by name and date of birth. Julia Haines was informed that this is a telemedicine visit and that the visit is being conducted through the Epic Embedded platform. She agrees to proceed..  My office door was closed. No one else was in the room.  She acknowledged consent and understanding of privacy and security of the video platform. The patient has agreed to participate and understands they can discontinue the visit at any time.    I have spent a total time of 37 minutes in caring for this patient on the day of the visit/encounter including Counseling / Coordination of care, not including the time spent for establishing the audio/video connection.    Behavioral Health Psychotherapy Progress Note    Psychotherapy Provided: Individual Psychotherapy     1. Postpartum depression            Goals addressed in session: Goal 1     DATA: Julia presented virtually in session and confirmed that she is at home. The session was focused on review of her mood and anxiety. She reports that she is making improvements with getting out of the house. She reports that she had a \"panic attack\" when her son's car seat got stuck her stroller and seh needed to call for help. Discussed perspective and working on " "building the skill set of getting out with the baby on her own. Suggested working with the stroller in order to get more confident as well. Julia asked about medication and she was directed to her OBGYN/PCP for follow up. She has been getting out with her baby, getting out with her partner and has been social which has been feeling good to her. Julia is encouraged to attend the  mothers' group in the coming weeks. Julia will continue to meet weekly for support at this time.     During this session, this clinician used the following therapeutic modalities: Cognitive Behavioral Therapy and Supportive Psychotherapy    Substance Abuse was addressed during this session. If the client is diagnosed with a co-occurring substance use disorder, please indicate any changes in the frequency or amount of use: Julia does not use substances. Stage of change for addressing substance use diagnoses: No substance use/Not applicable    ASSESSMENT:  Julia Haines presents with a Euthymic/ normal mood.     her affect is Normal range and intensity, which is congruent, with her mood and the content of the session. The client has made progress on their goals.    Julia was engaged in session. Julia Haines presents with a none risk of suicide, none risk of self-harm, and none risk of harm to others.    For any risk assessment that surpasses a \"low\" rating, a safety plan must be developed.    A safety plan was indicated: no  If yes, describe in detail na    PLAN: Between sessions, Julia Haines will continue to work on managing her mood and anxiety. At the next session, the therapist will use Cognitive Behavioral Therapy and Supportive Psychotherapy to address mood and anxiety.    Behavioral Health Treatment Plan and Discharge Planning: Julia Haines is aware of and agrees to continue to work on their treatment plan. They have identified and are working toward their discharge goals. yes    Depression Follow-up Plan " Completed: No    Visit start and stop times:    03/03/2025  Start Time: 1107  Stop Time: 1144  Total Visit Time: 37 minutes

## 2025-03-24 ENCOUNTER — TELEMEDICINE (OUTPATIENT)
Dept: BEHAVIORAL/MENTAL HEALTH CLINIC | Facility: CLINIC | Age: 20
End: 2025-03-24
Payer: COMMERCIAL

## 2025-03-24 DIAGNOSIS — F43.23 ADJUSTMENT DISORDER WITH MIXED ANXIETY AND DEPRESSED MOOD: Primary | ICD-10-CM

## 2025-03-24 PROCEDURE — 90832 PSYTX W PT 30 MINUTES: CPT | Performed by: COUNSELOR

## 2025-03-24 NOTE — PSYCH
"Virtual Regular VisitName: Julia Haines      : 2005      MRN: [id number]  Encounter Provider: Adebayo Alberts LPC  Encounter Date: 3/24/2025   Encounter department: [facility]  :  Assessment & Plan  Adjustment disorder with mixed anxiety and depressed mood             Goals addressed in session: Goal 1     DATA: Julia presented virtually in session and confirmed that she is at home. She reports that she is doing really well. She has been focused on getting out of the home, taking time for herself, and is feeling more self-assured. She is getting sleep, the baby is sleeping, and she is finding ways to communicate her needs. Julia will meet every three weeks for support.    During this session, this clinician used the following therapeutic modalities: Cognitive Behavioral Therapy and Supportive Psychotherapy    Substance Abuse was addressed during this session. If the client is diagnosed with a co-occurring substance use disorder, please indicate any changes in the frequency or amount of use: Julia does not use substances. Stage of change for addressing substance use diagnoses: No substance use/Not applicable    ASSESSMENT:  Julia presents with a Euthymic/ normal mood. Julia's affect is Normal range and intensity, which is congruent, with their mood and the content of the session. The client has made progress on their goals as evidenced by getting out more, exposing herself to situations that make her uncomfortable.    Julia presents with a none risk of suicide, none risk of self-harm, and none risk of harm to others.    For any risk assessment that surpasses a \"low\" rating, a safety plan must be developed.    A safety plan was indicated: no  If yes, describe in detail na    PLAN: Between sessions, Julia will continue to work on getting out with her son and taking time for herself.. At the next session, the therapist will use Cognitive Behavioral Therapy and " Supportive Psychotherapy to address mood and anxiety.    Behavioral Health Treatment Plan [facility]: Diagnosis and Treatment Plan explained to Julia, Julia relates understanding diagnosis and is agreeable to Treatment Plan. Yes     Depression Follow-up Plan Completed: No     Reason for visit is No chief complaint on file.     Recent Visits  No visits were found meeting these conditions.  Showing recent visits within past 7 days and meeting all other requirements  Today's Visits  Date Type Provider Dept   03/24/25 Telemedicine Adebayo Pugh LPC [facility]   Showing today's visits and meeting all other requirements  Future Appointments  No visits were found meeting these conditions.  Showing future appointments within next 150 days and meeting all other requirements     History of Present Illness     HPI    Past Medical History   Past Medical History:   Diagnosis Date    ADHD (attention deficit hyperactivity disorder)     not currently medicated    Anxiety     Attention deficit hyperactivity disorder (ADHD), predominantly inattentive type 03/05/2020    Started adderall 3/2020 - stopped Dec 2020 - she wasn't using it     Last Assessment & Plan:    - Stable   - Not currently taking any medications      Depression     Developmental delay 06/2009    Diet controlled gestational diabetes mellitus (GDM) in third trimester 10/16/2024    F/u growth ultrasound  11/14/24 32w: 65%  Repeat growth at 36w      History of menorrhagia     treated with OCP    History of palpitations     evaluated 6/2023; metoprolol    History of suicidal ideation 03/2022    few years ago, recovered    Pre-eclampsia in third trimester 12/23/2024    Elevated BP in triage last week with p/c ratio of 0.3; BP elevated in office today 142/96; patient reports pedal edema, but has no other signs/symptoms of severe preeclampsia  Discussed with patient recommendation for induction and delivery due to gestational age  Patient instructed to  proceed to Labor and Delivery for induction, L&D team notified      Seasonal allergic rhinitis     Varicella      Past Surgical History:   Procedure Laterality Date    AMNIOTIC FLUID INDEX WITH NST  12/20/2024    ROOT CANAL Left 09/12/2024    WISDOM TOOTH EXTRACTION       Current Outpatient Medications   Medication Instructions    acetaminophen (TYLENOL) 650 mg, Oral, Every 6 hours    benzocaine-menthol-lanolin-aloe (DERMOPLAST) 20-0.5 % topical spray 1 Application, Topical, Every 6 hours PRN    Jencycla 0.35 mg, Oral, Daily    Multiple Vitamins-Minerals (MULTIVITAMIN WOMEN PO) Take by mouth    witch hazel-glycerin (TUCKS) topical pad 1 Pad, Topical, Every 4 hours PRN     Allergies   Allergen Reactions    Shellfish Allergy - Food Allergy Anaphylaxis    Shellfish-Derived Products - Food Allergy Other (See Comments)     unknown    Octacosanol Allergic Rhinitis     Coughing, sneezing    Pollen Extract Drowsiness     Coughing, sneezing       Objective   There were no vitals taken for this visit.    Video Exam  Physical Exam     Administrative Statements   Encounter provider Adebayo Alberts LPC    The Patient is located at Home and in the following state in which I hold an active license [place].    The patient was identified by name and date of birth. Julia Haines was informed that this is a telemedicine visit and that the visit is being conducted through the Epic Embedded platform. She agrees to proceed..  My office door was closed. No one else was in the room.  She acknowledged consent and understanding of privacy and security of the video platform. The patient has agreed to participate and understands they can discontinue the visit at any time.    I have spent a total time of 20 minutes in caring for this patient on the day of the visit/encounter including Counseling / Coordination of care, not including the time spent for establishing the audio/video connection.    Visit Time  Start Time: 0907  Stop Time: 0927  Total  Visit Time: 20 minutes

## 2025-03-26 ENCOUNTER — TRANSCRIBE ORDERS (OUTPATIENT)
Dept: LAB | Facility: CLINIC | Age: 20
End: 2025-03-26

## 2025-03-26 DIAGNOSIS — Z86.32 HISTORY OF GESTATIONAL DIABETES: ICD-10-CM

## 2025-03-26 DIAGNOSIS — D64.9 RELATIVE ANEMIA: ICD-10-CM

## 2025-03-26 DIAGNOSIS — E78.00 PURE HYPERCHOLESTEROLEMIA: Primary | ICD-10-CM

## 2025-03-28 ENCOUNTER — APPOINTMENT (OUTPATIENT)
Dept: LAB | Facility: CLINIC | Age: 20
End: 2025-03-28
Payer: COMMERCIAL

## 2025-03-28 DIAGNOSIS — E78.00 PURE HYPERCHOLESTEROLEMIA: ICD-10-CM

## 2025-03-28 DIAGNOSIS — D64.9 RELATIVE ANEMIA: ICD-10-CM

## 2025-03-28 DIAGNOSIS — Z86.32 HISTORY OF GESTATIONAL DIABETES: ICD-10-CM

## 2025-03-28 LAB
ALBUMIN SERPL BCG-MCNC: 4.4 G/DL (ref 3.5–5)
ALP SERPL-CCNC: 91 U/L (ref 34–104)
ALT SERPL W P-5'-P-CCNC: 33 U/L (ref 7–52)
ANION GAP SERPL CALCULATED.3IONS-SCNC: 4 MMOL/L (ref 4–13)
AST SERPL W P-5'-P-CCNC: 22 U/L (ref 13–39)
BASOPHILS # BLD AUTO: 0.05 THOUSANDS/ÂΜL (ref 0–0.1)
BASOPHILS NFR BLD AUTO: 1 % (ref 0–1)
BILIRUB SERPL-MCNC: 0.3 MG/DL (ref 0.2–1)
BUN SERPL-MCNC: 12 MG/DL (ref 5–25)
CALCIUM SERPL-MCNC: 9.3 MG/DL (ref 8.4–10.2)
CHLORIDE SERPL-SCNC: 106 MMOL/L (ref 96–108)
CHOLEST SERPL-MCNC: 174 MG/DL (ref ?–200)
CO2 SERPL-SCNC: 29 MMOL/L (ref 21–32)
CREAT SERPL-MCNC: 0.69 MG/DL (ref 0.6–1.3)
EOSINOPHIL # BLD AUTO: 0.08 THOUSAND/ÂΜL (ref 0–0.61)
EOSINOPHIL NFR BLD AUTO: 1 % (ref 0–6)
ERYTHROCYTE [DISTWIDTH] IN BLOOD BY AUTOMATED COUNT: 12.8 % (ref 11.6–15.1)
FERRITIN SERPL-MCNC: 11 NG/ML (ref 11–307)
GFR SERPL CREATININE-BSD FRML MDRD: 124 ML/MIN/1.73SQ M
GLUCOSE P FAST SERPL-MCNC: 84 MG/DL (ref 65–99)
HCT VFR BLD AUTO: 43.1 % (ref 34.8–46.1)
HDLC SERPL-MCNC: 46 MG/DL
HGB BLD-MCNC: 14.2 G/DL (ref 11.5–15.4)
IMM GRANULOCYTES # BLD AUTO: 0.03 THOUSAND/UL (ref 0–0.2)
IMM GRANULOCYTES NFR BLD AUTO: 0 % (ref 0–2)
LDLC SERPL CALC-MCNC: 88 MG/DL (ref 0–100)
LYMPHOCYTES # BLD AUTO: 2.19 THOUSANDS/ÂΜL (ref 0.6–4.47)
LYMPHOCYTES NFR BLD AUTO: 27 % (ref 14–44)
MCH RBC QN AUTO: 30.5 PG (ref 26.8–34.3)
MCHC RBC AUTO-ENTMCNC: 32.9 G/DL (ref 31.4–37.4)
MCV RBC AUTO: 93 FL (ref 82–98)
MONOCYTES # BLD AUTO: 0.67 THOUSAND/ÂΜL (ref 0.17–1.22)
MONOCYTES NFR BLD AUTO: 8 % (ref 4–12)
NEUTROPHILS # BLD AUTO: 5.22 THOUSANDS/ÂΜL (ref 1.85–7.62)
NEUTS SEG NFR BLD AUTO: 63 % (ref 43–75)
NONHDLC SERPL-MCNC: 128 MG/DL
NRBC BLD AUTO-RTO: 0 /100 WBCS
PLATELET # BLD AUTO: 219 THOUSANDS/UL (ref 149–390)
PMV BLD AUTO: 13.3 FL (ref 8.9–12.7)
POTASSIUM SERPL-SCNC: 4.5 MMOL/L (ref 3.5–5.3)
PROT SERPL-MCNC: 6.8 G/DL (ref 6.4–8.4)
RBC # BLD AUTO: 4.66 MILLION/UL (ref 3.81–5.12)
SODIUM SERPL-SCNC: 139 MMOL/L (ref 135–147)
TRIGL SERPL-MCNC: 202 MG/DL (ref ?–150)
WBC # BLD AUTO: 8.24 THOUSAND/UL (ref 4.31–10.16)

## 2025-03-28 PROCEDURE — 36415 COLL VENOUS BLD VENIPUNCTURE: CPT

## 2025-03-28 PROCEDURE — 82728 ASSAY OF FERRITIN: CPT

## 2025-03-28 PROCEDURE — 80053 COMPREHEN METABOLIC PANEL: CPT

## 2025-03-28 PROCEDURE — 80061 LIPID PANEL: CPT

## 2025-03-28 PROCEDURE — 85025 COMPLETE CBC W/AUTO DIFF WBC: CPT

## 2025-04-14 ENCOUNTER — TELEMEDICINE (OUTPATIENT)
Dept: BEHAVIORAL/MENTAL HEALTH CLINIC | Facility: CLINIC | Age: 20
End: 2025-04-14
Payer: COMMERCIAL

## 2025-04-14 ENCOUNTER — ANNUAL EXAM (OUTPATIENT)
Dept: OBGYN CLINIC | Facility: CLINIC | Age: 20
End: 2025-04-14

## 2025-04-14 VITALS
DIASTOLIC BLOOD PRESSURE: 70 MMHG | SYSTOLIC BLOOD PRESSURE: 102 MMHG | BODY MASS INDEX: 30.22 KG/M2 | HEIGHT: 66 IN | WEIGHT: 188 LBS

## 2025-04-14 DIAGNOSIS — Z12.4 ENCOUNTER FOR PAPANICOLAOU SMEAR FOR CERVICAL CANCER SCREENING: ICD-10-CM

## 2025-04-14 DIAGNOSIS — Z01.419 ENCOUNTER FOR GYNECOLOGICAL EXAMINATION WITHOUT ABNORMAL FINDING: Primary | ICD-10-CM

## 2025-04-14 DIAGNOSIS — F43.23 ADJUSTMENT DISORDER WITH MIXED ANXIETY AND DEPRESSED MOOD: Primary | ICD-10-CM

## 2025-04-14 DIAGNOSIS — Z30.41 ORAL CONTRACEPTIVE PILL SURVEILLANCE: ICD-10-CM

## 2025-04-14 PROBLEM — Z3A.38 38 WEEKS GESTATION OF PREGNANCY: Status: RESOLVED | Noted: 2024-08-23 | Resolved: 2025-04-14

## 2025-04-14 PROCEDURE — G0145 SCR C/V CYTO,THINLAYER,RESCR: HCPCS | Performed by: NURSE PRACTITIONER

## 2025-04-14 PROCEDURE — 90832 PSYTX W PT 30 MINUTES: CPT | Performed by: COUNSELOR

## 2025-04-14 RX ORDER — ACETAMINOPHEN AND CODEINE PHOSPHATE 120; 12 MG/5ML; MG/5ML
1 SOLUTION ORAL DAILY
Qty: 84 TABLET | Refills: 4 | Status: SHIPPED | OUTPATIENT
Start: 2025-04-14

## 2025-04-14 RX ORDER — FERROUS SULFATE 325(65) MG
325 TABLET, DELAYED RELEASE (ENTERIC COATED) ORAL
COMMUNITY
Start: 2025-04-01 | End: 2026-04-01

## 2025-04-14 RX ORDER — ESCITALOPRAM OXALATE 5 MG/1
1 TABLET ORAL DAILY
COMMUNITY
Start: 2025-03-04 | End: 2025-04-14

## 2025-04-14 RX ORDER — DIPHENOXYLATE HYDROCHLORIDE AND ATROPINE SULFATE 2.5; .025 MG/1; MG/1
1 TABLET ORAL DAILY
COMMUNITY
End: 2025-04-14

## 2025-04-14 NOTE — PSYCH
"Virtual Regular VisitName: Julia Haines      : 2005      MRN: [id number]  Encounter Provider: Adebayo Alberts LPC  Encounter Date: 2025   Encounter department: [facility]  :  Assessment & Plan        Goals addressed in session: Goal 1     DATA: Julia presented virtually in session and confirmed that she is at home. The session was focused on review of her mood and anxiety. She is doing well. Her EPDS is a 1. She reports that she is getting out, using her supports, and coping with anxiety. She will meet monthly for support.    During this session, this clinician used the following therapeutic modalities: Cognitive Behavioral Therapy and Supportive Psychotherapy    Substance Abuse was addressed during this session. If the client is diagnosed with a co-occurring substance use disorder, please indicate any changes in the frequency or amount of use: Julia is not using substances. Stage of change for addressing substance use diagnoses: No substance use/Not applicable    ASSESSMENT:  Julia presents with a Euthymic/ normal mood. Julia's affect is Normal range and intensity, which is congruent, with their mood and the content of the session. The client has made progress on their goals as evidenced by working through anxiety and reduced EPDS.    Julia presents with a none risk of suicide, none risk of self-harm, and none risk of harm to others.    For any risk assessment that surpasses a \"low\" rating, a safety plan must be developed.    A safety plan was indicated: no  If yes, describe in detail na    PLAN: Between sessions, Julia will continue to work on managing her mood and anxiety. At the next session, the therapist will use Cognitive Behavioral Therapy and Supportive Psychotherapy to address mood and anxiety.    Behavioral Health Treatment Plan [facility]: Diagnosis and Treatment Plan explained to Julia, Julia relates understanding diagnosis and is agreeable to " Treatment Plan. Yes     Depression Follow-up Plan Completed: No     Reason for visit is No chief complaint on file.     Recent Visits  No visits were found meeting these conditions.  Showing recent visits within past 7 days and meeting all other requirements  Today's Visits  Date Type Provider Dept   04/14/25 Telemedicine Adebayo Pugh LPC [facility]   Showing today's visits and meeting all other requirements  Future Appointments  No visits were found meeting these conditions.  Showing future appointments within next 150 days and meeting all other requirements     History of Present Illness     HPI    Past Medical History   Past Medical History:   Diagnosis Date    ADHD (attention deficit hyperactivity disorder)     not currently medicated    Anxiety     Attention deficit hyperactivity disorder (ADHD), predominantly inattentive type 03/05/2020    Started adderall 3/2020 - stopped Dec 2020 - she wasn't using it     Last Assessment & Plan:    - Stable   - Not currently taking any medications      Depression     Developmental delay 06/2009    Diet controlled gestational diabetes mellitus (GDM) in third trimester 10/16/2024    F/u growth ultrasound  11/14/24 32w: 65%  Repeat growth at 36w      History of menorrhagia     treated with OCP    History of palpitations     evaluated 6/2023; metoprolol    History of suicidal ideation 03/2022    few years ago, recovered    Pre-eclampsia in third trimester 12/23/2024    Elevated BP in triage last week with p/c ratio of 0.3; BP elevated in office today 142/96; patient reports pedal edema, but has no other signs/symptoms of severe preeclampsia  Discussed with patient recommendation for induction and delivery due to gestational age  Patient instructed to proceed to Labor and Delivery for induction, L&D team notified      Seasonal allergic rhinitis     Varicella      Past Surgical History:   Procedure Laterality Date    AMNIOTIC FLUID INDEX WITH NST  12/20/2024     ROOT CANAL Left 09/12/2024    WISDOM TOOTH EXTRACTION       Current Outpatient Medications   Medication Instructions    acetaminophen (TYLENOL) 650 mg, Oral, Every 6 hours    benzocaine-menthol-lanolin-aloe (DERMOPLAST) 20-0.5 % topical spray 1 Application, Topical, Every 6 hours PRN    Jencycla 0.35 mg, Oral, Daily    Multiple Vitamins-Minerals (MULTIVITAMIN WOMEN PO) Take by mouth    witch hazel-glycerin (TUCKS) topical pad 1 Pad, Topical, Every 4 hours PRN     Allergies   Allergen Reactions    Shellfish Allergy - Food Allergy Anaphylaxis    Shellfish-Derived Products - Food Allergy Other (See Comments)     unknown    Octacosanol Allergic Rhinitis     Coughing, sneezing    Pollen Extract Drowsiness     Coughing, sneezing       Objective   There were no vitals taken for this visit.    Video Exam  Physical Exam     Administrative Statements   Encounter provider Adebayo Alberts LPC    The Patient is located at Home and in the following state in which I hold an active license [place].    The patient was identified by name and date of birth. Julia Haines was informed that this is a telemedicine visit and that the visit is being conducted through the Epic Embedded platform. She agrees to proceed..  My office door was closed. No one else was in the room.  She acknowledged consent and understanding of privacy and security of the video platform. The patient has agreed to participate and understands they can discontinue the visit at any time.    I have spent a total time of 19 minutes in caring for this patient on the day of the visit/encounter including Counseling / Coordination of care, not including the time spent for establishing the audio/video connection.    Visit Time  April 14, 2025  Start Time: 0906  Stop Time: 0925  Total Visit Time: 19 minutes

## 2025-04-14 NOTE — PROGRESS NOTES
"Assessment / Plan    Assessment & Plan  Encounter for gynecological examination without abnormal finding  Normal well woman exam.  First pap smear collected.       Encounter for Papanicolaou smear for cervical cancer screening    Orders:    Liquid-based pap, screening    Oral contraceptive pill surveillance  Refills sent    Orders:    norethindrone (Jencycla) 0.35 MG tablet; Take 1 tablet (0.35 mg total) by mouth daily        Subjective      Julia Haines is a 19 y.o.  female who presents for her annual gynecologic exam.    Last seen 2025 for postpartum depression check.  Reports that she is doing \"much better!\".  She is not taking any medication for depression.  She is still taking  progesterone only pill for birth control.  She is no longer breast feeding.  She had pre-eclampsia in her 3rd trimester as well as GDMA1.  She had a normal 2hr GTT.    Last pap: n/a  Pap hx: n/a  STD screenin2024 G/C negative  STD hx: none  Sexually active: yes, 2 yr relationship  Condom use: yes  Gardasil vaccine: completed  Nutrition: Body mass index is 30.34 kg/m².; given guidelines  Calcium intake: given guidelines  Exercise: gym 1x per week; walks every other day; given guidelines  Safety: feels safe    Periods are irregular; on  progesterone only pill.   Current contraception: oral progesterone-only contraceptive  History of abnormal Pap smear: no  Family history of breast,uterine, ovarian or colon cancer: no    The following portions of the patient's history were reviewed and updated as appropriate: allergies, current medications, past family history, past medical history, past social history, past surgical history, and problem list.    Review of Systems      Review of Systems   Constitutional:  Negative for chills and fever.   Respiratory:  Negative for cough and shortness of breath.    Gastrointestinal:  Negative for abdominal distention, abdominal pain, blood in stool, constipation, diarrhea, nausea and " "vomiting.   Genitourinary:  Negative for difficulty urinating, dysuria, frequency, genital sores, hematuria, menstrual problem, pelvic pain, urgency, vaginal bleeding and vaginal discharge.   Musculoskeletal:  Negative for arthralgias and myalgias.     Breasts:  Negative for skin changes, dimpling, asymmetry, nipple discharge, redness, tenderness or palpable masses    Objective     *patient agrees to exam without a chaperone present.     /70 (BP Location: Left arm, Patient Position: Sitting, Cuff Size: Standard)   Ht 5' 6\" (1.676 m)   Wt 85.3 kg (188 lb)   LMP 04/01/2025 (Exact Date)   Breastfeeding No   BMI 30.34 kg/m²   Physical Exam  Constitutional:       General: She is not in acute distress.     Appearance: Normal appearance. She is well-developed. She is not ill-appearing or diaphoretic.      Comments: Body mass index is 30.34 kg/m².     HENT:      Head: Normocephalic and atraumatic.   Eyes:      Pupils: Pupils are equal, round, and reactive to light.   Neck:      Thyroid: No thyromegaly.   Pulmonary:      Effort: Pulmonary effort is normal.   Chest:   Breasts:     Breasts are symmetrical.      Right: No inverted nipple, mass, nipple discharge, skin change or tenderness.      Left: No inverted nipple, mass, nipple discharge, skin change or tenderness.   Abdominal:      General: There is no distension.      Palpations: Abdomen is soft. There is no mass.      Tenderness: There is no abdominal tenderness. There is no guarding or rebound.   Genitourinary:     General: Normal vulva.      Exam position: Lithotomy position.      Labia:         Right: No rash, tenderness, lesion or injury.         Left: No rash, tenderness, lesion or injury.       Vagina: No signs of injury and foreign body. No vaginal discharge, erythema, tenderness or bleeding.      Cervix: No cervical motion tenderness, discharge or friability.      Uterus: Not enlarged and not tender.       Adnexa:         Right: No mass or tenderness.  "         Left: No mass or tenderness.        Rectum: Normal.   Musculoskeletal:      Cervical back: Neck supple.   Lymphadenopathy:      Cervical: No cervical adenopathy.      Upper Body:      Right upper body: No supraclavicular adenopathy.      Left upper body: No supraclavicular adenopathy.   Skin:     General: Skin is warm and dry.   Neurological:      General: No focal deficit present.      Mental Status: She is alert and oriented to person, place, and time.   Psychiatric:         Mood and Affect: Mood normal.         Behavior: Behavior normal.         Thought Content: Thought content normal.         Judgment: Judgment normal.

## 2025-04-14 NOTE — PATIENT INSTRUCTIONS
"Patient Education     Diet and health   The Basics   Written by the doctors and editors at [facility]   Why is it important to eat a healthy diet? -- It's important to eat a healthy diet because eating the right foods can keep you healthy now and later in life. It can lower the risk of problems like heart disease, diabetes, high blood pressure, and some types of cancer. It can also help you live longer and improve your quality of life.  What kind of diet is best? -- There is no 1 specific diet that experts recommend for everyone. People choose what foods to eat for many different reasons. These include personal preference, culture, Jain, allergies or intolerances, and nutritional goals. People also need to consider the cost and availability of different foods.  In general, experts recommend a diet that:   Includes lots of vegetables, fruits, beans, nuts, and whole grains   Limits red and processed meats, unhealthy fats, sugar, salt, and alcohol  What are dietary patterns? -- A dietary \"pattern\" means generally eating certain types of foods while limiting others. Some people need to follow a specific dietary pattern because of their health needs. For example, if you have high blood pressure, your doctor might recommend a diet low in salt.  If you are trying to improve your health in general, choosing a healthy dietary pattern can help. This does not have to mean being very strict about what you eat or avoid. The goal is to think about getting plenty of healthy foods while limiting less healthy foods.  Examples of dietary patterns include:   Mediterranean diet - This involves eating a lot of fruits, vegetables, nuts, and whole grains, and uses olive oil instead of other fats. It also includes some fish, poultry, and dairy products, but not a lot of red meat. Following this diet can help your overall health, and might even lower your risk of having a stroke.   Plant-based diets - These patterns focus on vegetables, " "fruits, grains, beans, and nuts. They limit or avoid food that comes from animals, such as meat and dairy. There are different types of plant-based diets, including vegetarian and vegan.   Low-fat diet - A low-fat diet involves limiting calories from fat. This might help some people keep weight off if that is their goal, but it does not have many other health benefits. If you choose to follow a low-fat diet, it is also important to focus on getting lots of whole grains, legumes, fruits, and vegetables. Limit refined grains and sugar.   Low-cholesterol diet - Cholesterol is found in foods with a lot of saturated fat, like red meat, butter, and cheese. A low-cholesterol diet focuses on limiting the amount of cholesterol that you eat. Limiting the cholesterol in your diet can also help lower the amount of unhealthy fats that you eat.  Which foods are especially healthy? -- Foods that are especially healthy include:   Fruits and vegetables - Eating a diet with lots of fruits and vegetables can help prevent heart disease and stroke. It might also help prevent certain types of cancer. Try to eat fruits and vegetables at each meal and also for snacks. If you don't have fresh fruits and vegetables available, you can eat frozen or canned ones instead. Doctors recommend eating at least 5 servings of fruits or vegetables each day.   Whole grains - Whole-grain foods include 100 percent whole-wheat bread, steel cut oats, and whole-grain pasta. These are healthier than foods made with \"refined\" grains, like white bread and white rice. Eating lots of whole grains instead of refined grains has been shown to help with weight control. It can also lower the risk of several health problems, including colon cancer, heart disease, and diabetes. Doctors recommend that most people try to eat 5 to 8 servings of whole-grain, high-fiber foods each day.   Foods with fiber - Eating foods with a lot of fiber can help prevent heart disease and " "stroke. If you have type 2 diabetes, it can also help control your blood sugar. Foods that have a lot of fiber include vegetables, fruits, beans, nuts, oatmeal, and whole-grain breads and cereals. You can tell how much fiber is in a food by reading the nutrition label (figure 1). Doctors recommend that most people eat about 25 to 34 grams of fiber each day.   Foods with calcium and vitamin D - Babies, children, and adults need calcium and vitamin D to help keep their bones strong. Adults also need calcium and vitamin D to help prevent osteoporosis. Osteoporosis is a condition that causes bones to get \"thin\" and break more easily than usual. Different foods and drinks have calcium and vitamin D in them (figure 2). People who don't get enough calcium and vitamin D in their diet might need to take a supplement. Doctors recommend that most people have 2 to 3 servings of foods with calcium and vitamin D each day.   Foods with protein - Protein helps your muscles and bones stay strong. Healthy foods with a lot of protein include chicken, fish, eggs, beans, nuts, and soy products. Red meat also has a lot of protein, but it also contains fats, which can be unhealthy. Doctors recommend that most people try to eat about 5 servings of protein each day.   Healthy fats - There are different types of fats. Some types of fats are better for your body than others. Healthy fats are \"monounsaturated\" or \"polyunsaturated\" fats. These are found in fatty fish, nuts and nut butters, and avocados. Use plant-based oils when cooking. Examples of these oils include olive, canola, safflower, sunflower, and corn oil. Eating foods with healthy fats, while avoiding or limiting foods with unhealthy fats, might lower the risk of heart disease.   Foods with folate - Folate is a vitamin that is important for pregnant people, since it helps prevent certain birth defects. It is also called \"folic acid.\" Anyone who could get pregnant should get at " "least 400 micrograms of folic acid daily, whether or not they are actively trying to get pregnant. Folate is found in many breakfast cereals, oranges, orange juice, and green leafy vegetables.  What foods should I avoid or limit? -- To eat a healthy diet, there are some things that you should avoid or limit. They include:   Unhealthy fats - \"Trans\" fats are especially unhealthy. They are found in margarines, many fast foods, and some store-bought baked goods. \"Saturated\" fats are found in animal products like meats, egg yolks, butter, cheese, and full-fat milk products. Unhealthy fats can raise your cholesterol level and increase your chance of getting heart disease.   Sugar - To have a healthy diet, it's important to limit or avoid added sugar, sweets, and refined grains. Refined grains are found in white bread, white rice, most pastas, and most packaged \"snack\" foods.  Avoiding sugar-sweetened beverages, like soda and sports drinks, can also help improve your health.  Avoid canned fruits in \"heavy\" syrup.   Red and processed meats - Studies have shown that eating a lot of red meat can increase your risk of certain health problems, including heart disease and cancer. You should limit the amount of red meat that you eat. This is also true for processed meats like sausage, hot dogs, and cowan.  Can I drink alcohol as part of a healthy diet? -- Not drinking alcohol at all is the healthiest choice. Regular drinking can raise a person's chances of getting liver disease and certain types of cancers. In females, even 1 drink a day can increase the risk of getting breast cancer.  If you do choose to drink, most doctors recommend limiting alcohol to no more than:   1 drink a day for females   2 drinks a day for males  The limits are different because, generally, the female body takes longer to break down alcohol.  How many calories do I need each day? -- Calories give your body energy. The number of calories that you need " "each day depends on your weight, height, age, sex, and how active you are.  Your doctor or nurse can tell you about how many calories you should eat each day. You can also work with a dietitian (nutrition expert) to learn more about your dietary needs and options.  What if I am having trouble improving my diet? -- It can be hard to change the way that you eat. Remember that even small changes can improve your health.  Here are some tips that might help:   Try to make fruits and vegetables part of every meal. If you don't have fresh fruits and vegetables, frozen or canned are good options. Look for products without added salt or sugar.   Keep a bowl of fruit out for snacking.   When you can, choose whole grains instead of refined grains. Choose chicken, fish, and beans instead of red meat and cheese.   Try to eat prepared and processed foods less often.   Try flavored seltzer or water instead of soda or juice.   When eating at fast food restaurants, look for healthier items, like broiled chicken or salad.  If you have questions about which foods you should or should not eat, ask your doctor, nurse, or dietitian. The right diet for you will depend, in part, on your health and any medical conditions you have.  All topics are updated as new evidence becomes available and our peer review process is complete.  This topic retrieved from [employer] on: Feb 28, 2024.  Topic 20606 Version 28.0  Release: 32.2.4 - C32.58  © 2024 UpToDate, Inc. and/or its affiliates. All rights reserved.  figure 1: Nutrition label - Fiber     This is an example of a nutrition label. To figure out how much fiber is in a food, look for the line that says \"Dietary Fiber.\" It's also important to look at the serving size. This food has 7 grams of fiber in each serving, and each serving is 1 cup.  Graphic 80691 Version 8.0  figure 2: Foods and drinks with calcium and vitamin D     Foods rich in calcium include ice cream, soy milk, breads, kale, " "broccoli, milk, cheese, cottage cheese, almonds, yogurt, ready-to-eat cereals, beans, and tofu. Foods rich in vitamin D include milk, fortified plant-based \"milks\" (soy, almond), canned tuna fish, cod liver oil, yogurt, ready-to-eat-cereals, cooked salmon, canned sardines, mackerel, and eggs. Some of these foods are rich in both.  Graphic 44247 Version 4.0  Consumer Information Use and Disclaimer   Disclaimer: This generalized information is a limited summary of diagnosis, treatment, and/or medication information. It is not meant to be comprehensive and should be used as a tool to help the user understand and/or assess potential diagnostic and treatment options. It does NOT include all information about conditions, treatments, medications, side effects, or risks that may apply to a specific patient. It is not intended to be medical advice or a substitute for the medical advice, diagnosis, or treatment of a health care provider based on the health care provider's examination and assessment of a patient's specific and unique circumstances. Patients must speak with a health care provider for complete information about their health, medical questions, and treatment options, including any risks or benefits regarding use of medications. This information does not endorse any treatments or medications as safe, effective, or approved for treating a specific patient. UpToDate, Inc. and its affiliates disclaim any warranty or liability relating to this information or the use thereof.The use of this information is governed by the Terms of Use, available at https://www.wolterskluwer.com/en/know/clinical-effectiveness-terms. 2024© UpToDate, Inc. and its affiliates and/or licensors. All rights reserved.  Copyright   © 2024 UpToDate, Inc. and/or its affiliates. All rights reserved.  Patient Education     Calcium Rich Diet   About this topic   Calcium is one of the most important minerals and is found in almost all parts of your " body. It makes your teeth and bones strong and healthy. Your body does not make calcium. It is important for you to eat calcium rich foods to get enough calcium. It also helps your body:  Make blood clots  Keep your heartbeat normal  Helps blood move throughout the body  Release hormones  Release enzymes  Send and get signals between your nerves and brain  Make muscles work well         What will the results be?   It is important to have a good amount of calcium in your food. Calcium helps your body work well. It is very important during every life stage. Calcium may also keep you from losing bone mass. This is osteopenia. It may help keep you from having weak bones. This is osteoporosis.  What drugs may be needed?   The doctor may order calcium and vitamin D supplements. You need vitamin D to help your body take in the calcium. Your calcium supplement may have vitamin D in it.  Talk to your doctor about:  If it is OK to take your calcium with food.  How often to take your calcium supplement throughout the day.  All the drugs you are taking. Be sure to include all prescription and over-the-counter (OTC) drugs, and herbal supplements. Tell the doctor about any drug allergy. Bring a list of drugs you take with you. Some drugs may cause problems with how your body takes in calcium.  Will physical activity be limited?   No, physical activities will not be limited. It is good for you to do light exercises and to stay active.  What changes to diet are needed?   You will need to watch how much calcium is in the foods you eat. Your doctor will talk to you about the right amount of calcium for you. How much calcium you need is based on your age and life stage.  When is this diet used?   This diet is used when your normal diet is low in calcium. It is needed to raise the amount of calcium in your diet. Our bodies do not take in calcium well as we get older.  Who should not use this diet?   People with too much calcium in  their blood should not use this diet. This is called hypercalcemia.  What foods are good to eat?   Milk, yogurt, and cheese products are naturally high in calcium.  These foods have smaller amounts of calcium. If they are eaten often and in large amounts they can be good sources of calcium:  Oysters; dried fruit like figs and raisins; green leafy vegetables like broccoli, collards, kale, mustard greens, bok nick; soy beans; oranges  [place] and sardines. Be sure to eat the soft bones.  Nuts like almonds and [place] nuts, sunflower seeds, tahini, dried beans  Food products with calcium added to them like orange juice, tofu, cereal, bread; almond milk, rice milk, soy milk  What foods should be limited or avoided?   People who eat many kinds of foods do not have to be worried about limiting or avoiding certain foods.   What problems could happen?   Some people may get kidney stones. This may happen after taking high amounts of calcium over a long time. Calcium from food does not seem to cause kidney stones.  Hard stools.  Too much calcium may interfere with your body’s ability to absorb iron and zinc.  When do I need to call the doctor?   Call your doctor if you have concerns about your diet. Tell your doctor if you are allergic to any of the foods in your diet.  Helpful tips   If you have problems digesting milk, try lactose-free milk. You may also use a product to help you take in lactose.  Talk with your doctor if you are a vegetarian and do not eat dairy products. Your doctor can help you make sure you get the amount of calcium your body needs.  Last Reviewed Date   2021-03-12  Consumer Information Use and Disclaimer   This generalized information is a limited summary of diagnosis, treatment, and/or medication information. It is not meant to be comprehensive and should be used as a tool to help the user understand and/or assess potential diagnostic and treatment options. It does NOT include all information about  conditions, treatments, medications, side effects, or risks that may apply to a specific patient. It is not intended to be medical advice or a substitute for the medical advice, diagnosis, or treatment of a health care provider based on the health care provider's examination and assessment of a patient’s specific and unique circumstances. Patients must speak with a health care provider for complete information about their health, medical questions, and treatment options, including any risks or benefits regarding use of medications. This information does not endorse any treatments or medications as safe, effective, or approved for treating a specific patient. UpToDate, Inc. and its affiliates disclaim any warranty or liability relating to this information or the use thereof. The use of this information is governed by the Terms of Use, available at https://www.[employer].com/en/know/clinical-effectiveness-terms   Copyright   Copyright © 2024 UpToDate, Inc. and its affiliates and/or licensors. All rights reserved.  Patient Education     Exercise and movement   The Basics   Written by the doctors and editors at [employer]   What are the benefits of movement? -- Moving your body has many benefits. It can:   Burn calories, which helps people manage their weight   Help control blood sugar levels in people with diabetes   Lower blood pressure, especially in people with high blood pressure   Lower stress, and help with depression and anxiety   Keep bones strong, so they don't get thin and break easily   Lower the chance of dying from heart disease  Adding even small amounts of physical activity to your daily routine can improve your health.  What are the main types of exercise? -- There are 3 main types of exercise:   Aerobic exercise - This raises your heart rate. Examples include walking, running, dancing, riding a bike, and swimming.   Muscle strengthening - This helps make your muscles stronger. You can do it using  weights, exercise bands, or weight machines. You can also use your own body weight, as with push-ups, or by lifting items in your home, like jugs of water.   Stretching - These help your muscles and joints move more easily.  It's important to have all 3 types in your exercise program. That way, your body, muscles, and joints can be as healthy as possible.  Should I talk to my doctor or nurse before I start exercising? -- If you have not exercised before or have not exercised in a long time, talk with your doctor or nurse before you start a very active exercise program.  If you have heart disease or risk factors for heart disease (like high blood pressure or diabetes), your doctor or nurse might recommend that you have an exercise test before starting an exercise program.  When you begin an exercise program, start slowly. For example, do the exercise at a slow pace or for a few minutes only. Over time, you can exercise faster and for longer periods of time.  What should I do when I exercise? -- Each time you exercise, you should:   Warm up - This can help keep you from hurting your muscles when you exercise. To warm up, do a light aerobic exercise (such as walking slowly) or stretch for 5 to 10 minutes.   Work out - Try to get a mix of aerobic exercise, muscle strengthening, and stretching. During an aerobic workout, you can walk fast, swim, run, or use an exercise machine, for example. Other activities, like dancing or playing tennis, are also forms of aerobic exercise. You should also take time to stretch all of your joints, including your neck, shoulders, back, hips, and knees. At least 2 times a week, you can do muscle strengthening exercises as part of your workout.   Cool down - This helps keep you from feeling dizzy after you exercise and helps prevent muscle cramps. To cool down, you can stretch or do a light aerobic exercise for 5 minutes.  Some people go to a gym or do group exercise classes. But you can  exercise even without these things. Some exercises can be done even in a small space. You can also try online videos or smartphone apps to get ideas for different types of exercise.  How often should I exercise? -- Doctors recommend that people exercise at least 30 minutes a day, on 5 or more days of the week.  If you can't exercise for 30 minutes straight, try to exercise for 10 minutes at a time, 3 or 4 times a day. Even exercising for shorter amounts of time is good for you, especially if it means spending less time sitting.  When should I call my doctor or nurse? -- If you have any of the following symptoms when you exercise, stop exercising and call your doctor or nurse right away:   Pain or pressure in your chest, arms, throat, jaw, or back   Nausea or vomiting   Feeling like your heart is fluttering or racing very fast   Feeling dizzy or faint  What if I don't have time to exercise? -- Many people have very busy lives and might not think that they have time to exercise. But it's important to try to find time, even if you are tired or work a lot. Exercise can increase your energy level, which can make you feel better and might even help you get more work done.  Even if it's hard to set aside a lot of time to exercise, you can still improve your health by moving your body more. There are many ways that you can be more active. For example, you can:   Take the stairs instead of the elevator.   Park in a parking space that is farther away from the door.   Take a longer route when you walk from one place to another.  Spending a lot of time sitting still (for example, watching TV or working on the computer) is bad for your health. Try to get up and move around whenever you can. Even small amounts of movement, like taking short walks, doing household chores, or gardening, can improve your health. Finding activities that you enjoy, or doing them with other people, can help you add more movement into your daily  life.  What else should I do when I exercise? -- To exercise safely and avoid problems, it's important to:   Drink fluids during and after exercising (but avoid drinks with a lot of caffeine or sugar).   Avoid exercising outside if it is too hot or cold.   Wear layers of clothes, so that you can take them off if you get too hot.   Wear shoes that fit well and support your feet.   Be aware of your surroundings if you exercise outside.  All topics are updated as new evidence becomes available and our peer review process is complete.  This topic retrieved from [employer] on: May 18, 2024.  Topic 92099 Version 31.0  Release: 32.4.3 - C32.137  © 2024 UpToDate, Inc. and/or its affiliates. All rights reserved.  Consumer Information Use and Disclaimer   Disclaimer: This generalized information is a limited summary of diagnosis, treatment, and/or medication information. It is not meant to be comprehensive and should be used as a tool to help the user understand and/or assess potential diagnostic and treatment options. It does NOT include all information about conditions, treatments, medications, side effects, or risks that may apply to a specific patient. It is not intended to be medical advice or a substitute for the medical advice, diagnosis, or treatment of a health care provider based on the health care provider's examination and assessment of a patient's specific and unique circumstances. Patients must speak with a health care provider for complete information about their health, medical questions, and treatment options, including any risks or benefits regarding use of medications. This information does not endorse any treatments or medications as safe, effective, or approved for treating a specific patient. UpToDate, Inc. and its affiliates disclaim any warranty or liability relating to this information or the use thereof.The use of this information is governed by the Terms of Use, available at  https://www.woltersRenegade Gamesuwer.com/en/know/clinical-effectiveness-terms. 2024© [employer] and its affiliates and/or licensors. All rights reserved.  Copyright   © 2024 [employer] and/or its affiliates. All rights reserved.

## 2025-04-17 LAB
LAB AP GYN PRIMARY INTERPRETATION: NORMAL
Lab: NORMAL

## 2025-06-17 NOTE — TELEPHONE ENCOUNTER
Overall how are you feeling? Patient states she is doing well.  Complaining of urinary frequency and burning with urination.  Had U/A and urine culture done yesterday.  Urine culture results are still pending.     Compliant with routine OB appointments? Yes  Have you completed your 3rd trimester lab work? Yes    Have you reviewed the contents of 3rd trimester folder from office?  No, patient will receive folder at next prenatal appointment and contents will be reviewed at that time.   Have you decided on a pediatrician? No    If yes, who:   Is considering a [facility] pediatrician at either [facility] or [facility].    If no, what are you looking for and request sent for outreach.  Questions on paperwork to go back to office? Patient will receive folder and forms at next prenatal appointment.     Questions on the baby birth certificate forms? Patient will receive folder and forms at next prenatal appointment.      Sent link for the Hospital Readiness Video via [facility]   
No